# Patient Record
Sex: MALE | Race: WHITE
[De-identification: names, ages, dates, MRNs, and addresses within clinical notes are randomized per-mention and may not be internally consistent; named-entity substitution may affect disease eponyms.]

---

## 2017-04-17 ENCOUNTER — HOSPITAL ENCOUNTER (INPATIENT)
Dept: HOSPITAL 45 - C.EDB | Age: 77
LOS: 1 days | Discharge: HOME | DRG: 419 | End: 2017-04-18
Attending: INTERNAL MEDICINE | Admitting: HOSPITALIST
Payer: COMMERCIAL

## 2017-04-17 VITALS
BODY MASS INDEX: 26.61 KG/M2 | HEIGHT: 69 IN | HEIGHT: 69 IN | WEIGHT: 179.68 LBS | WEIGHT: 179.68 LBS | BODY MASS INDEX: 26.61 KG/M2

## 2017-04-17 VITALS — DIASTOLIC BLOOD PRESSURE: 83 MMHG | SYSTOLIC BLOOD PRESSURE: 146 MMHG | HEART RATE: 80 BPM

## 2017-04-17 VITALS
OXYGEN SATURATION: 97 % | DIASTOLIC BLOOD PRESSURE: 79 MMHG | HEART RATE: 93 BPM | TEMPERATURE: 97.52 F | SYSTOLIC BLOOD PRESSURE: 118 MMHG

## 2017-04-17 VITALS
OXYGEN SATURATION: 93 % | HEART RATE: 133 BPM | TEMPERATURE: 97.7 F | SYSTOLIC BLOOD PRESSURE: 122 MMHG | DIASTOLIC BLOOD PRESSURE: 79 MMHG

## 2017-04-17 VITALS
HEART RATE: 83 BPM | TEMPERATURE: 99.14 F | SYSTOLIC BLOOD PRESSURE: 117 MMHG | OXYGEN SATURATION: 94 % | DIASTOLIC BLOOD PRESSURE: 75 MMHG

## 2017-04-17 VITALS
SYSTOLIC BLOOD PRESSURE: 108 MMHG | HEART RATE: 79 BPM | DIASTOLIC BLOOD PRESSURE: 66 MMHG | OXYGEN SATURATION: 93 % | TEMPERATURE: 98.6 F

## 2017-04-17 DIAGNOSIS — I45.10: ICD-10-CM

## 2017-04-17 DIAGNOSIS — N40.0: ICD-10-CM

## 2017-04-17 DIAGNOSIS — Z87.891: ICD-10-CM

## 2017-04-17 DIAGNOSIS — Z79.82: ICD-10-CM

## 2017-04-17 DIAGNOSIS — I10: ICD-10-CM

## 2017-04-17 DIAGNOSIS — Z79.899: ICD-10-CM

## 2017-04-17 DIAGNOSIS — K80.00: Primary | ICD-10-CM

## 2017-04-17 DIAGNOSIS — M19.90: ICD-10-CM

## 2017-04-17 DIAGNOSIS — I48.0: ICD-10-CM

## 2017-04-17 DIAGNOSIS — K66.0: ICD-10-CM

## 2017-04-17 LAB
ALP SERPL-CCNC: 94 U/L (ref 45–117)
ALT SERPL-CCNC: 26 U/L (ref 12–78)
ANION GAP SERPL CALC-SCNC: 18 MMOL/L (ref 16–25)
ANION GAP SERPL CALC-SCNC: 5 MMOL/L (ref 3–11)
ANION GAP SERPL CALC-SCNC: 6 MMOL/L (ref 3–11)
APPEARANCE UR: CLEAR
AST SERPL-CCNC: 20 U/L (ref 15–37)
BASOPHILS # BLD: 0.01 K/UL (ref 0–0.2)
BASOPHILS NFR BLD: 0.1 %
BILIRUB UR-MCNC: (no result) MG/DL
BUN SERPL-MCNC: 15 MG/DL (ref 7–18)
BUN SERPL-MCNC: 24 MG/DL (ref 7–18)
BUN/CREAT SERPL: 12.1 (ref 10–20)
BUN/CREAT SERPL: 22 (ref 10–20)
CA-I BLD-SCNC: 1.16 MMOL/L (ref 1.12–1.32)
CALCIUM SERPL-MCNC: 8.1 MG/DL (ref 8.5–10.1)
CALCIUM SERPL-MCNC: 8.6 MG/DL (ref 8.5–10.1)
CHLORIDE BLD-SCNC: 98 MEQ/L (ref 101–112)
CHLORIDE SERPL-SCNC: 103 MMOL/L (ref 98–107)
CHLORIDE SERPL-SCNC: 106 MMOL/L (ref 98–107)
CO2 SERPL-SCNC: 30 MMOL/L (ref 21–32)
CO2 SERPL-SCNC: 32 MMOL/L (ref 21–32)
COLOR UR: YELLOW
COMPLETE: YES
CREAT BLD-MCNC: 0.9 MG/DL (ref 0.6–1.3)
CREAT CL PREDICTED SERPL C-G-VRATE: 51.6 ML/MIN
CREAT CL PREDICTED SERPL C-G-VRATE: 56.3 ML/MIN
CREAT SERPL-MCNC: 1.1 MG/DL (ref 0.6–1.4)
CREAT SERPL-MCNC: 1.2 MG/DL (ref 0.6–1.4)
EOSINOPHIL NFR BLD AUTO: 211 K/UL (ref 130–400)
GLUCOSE SERPL-MCNC: 132 MG/DL (ref 70–99)
GLUCOSE SERPL-MCNC: 160 MG/DL (ref 70–99)
HCT VFR BLD AUTO: 48 % (ref 42–52)
HCT VFR BLD CALC: 44.9 % (ref 42–52)
HGB BLD-MCNC: 16.3 G/DL (ref 14–18)
IG%: 0.1 %
IMM GRANULOCYTES NFR BLD AUTO: 13.7 %
ISTAT CARBON DIOXIDE: 26 MEQ/L (ref 24–31)
LYMPHOCYTES # BLD: 1.15 K/UL (ref 1.2–3.4)
MAGNESIUM SERPL-MCNC: 2.2 MG/DL (ref 1.8–2.4)
MANUAL MICROSCOPIC REQUIRED?: NO
MCH RBC QN AUTO: 32.1 PG (ref 25–34)
MCHC RBC AUTO-ENTMCNC: 35.2 G/DL (ref 32–36)
MCV RBC AUTO: 91.3 FL (ref 80–100)
MONOCYTES NFR BLD: 8 %
NEUTROPHILS # BLD AUTO: 0.8 %
NEUTROPHILS NFR BLD AUTO: 77.3 %
NITRITE UR QL STRIP: (no result)
PH UR STRIP: 5 [PH] (ref 4.5–7.5)
PMV BLD AUTO: 10.8 FL (ref 7.4–10.4)
POTASSIUM SERPL-SCNC: 3.9 MMOL/L (ref 3.5–5.1)
POTASSIUM SERPL-SCNC: 4.1 MMOL/L (ref 3.5–5.1)
RBC # BLD AUTO: 4.92 M/UL (ref 4.7–6.1)
REVIEW REQ?: NO
SODIUM BLD-SCNC: 138 MEQ/L (ref 135–144)
SODIUM SERPL-SCNC: 141 MMOL/L (ref 136–145)
SODIUM SERPL-SCNC: 141 MMOL/L (ref 136–145)
SP GR UR STRIP: 1.02 (ref 1–1.03)
URINE EPITHELIAL CELL AUTO: (no result) /LPF (ref 0–5)
UROBILINOGEN UR-MCNC: (no result) MG/DL
WBC # BLD AUTO: 8.39 K/UL (ref 4.8–10.8)
ZZUR CULT IF INDIC CLEAN CATCH: NO

## 2017-04-17 PROCEDURE — 0FT44ZZ RESECTION OF GALLBLADDER, PERCUTANEOUS ENDOSCOPIC APPROACH: ICD-10-PCS | Performed by: SURGERY

## 2017-04-17 PROCEDURE — BF100ZZ FLUOROSCOPY OF BILE DUCTS USING HIGH OSMOLAR CONTRAST: ICD-10-PCS | Performed by: SURGERY

## 2017-04-17 RX ADMIN — AMIODARONE HYDROCHLORIDE SCH MG: 200 TABLET ORAL at 21:15

## 2017-04-17 RX ADMIN — SODIUM CHLORIDE SCH MLS/HR: 900 INJECTION, SOLUTION INTRAVENOUS at 20:21

## 2017-04-17 RX ADMIN — CEFOXITIN SODIUM SCH MLS/HR: 1 POWDER, FOR SOLUTION INTRAVENOUS at 21:20

## 2017-04-17 RX ADMIN — CEFOXITIN SODIUM SCH MLS/HR: 1 POWDER, FOR SOLUTION INTRAVENOUS at 14:11

## 2017-04-17 NOTE — HISTORY & PHYSICAL BRIDGE NOTE
H&P Re-Evaluation


Bridge Note:


I have examined the patient, reviewed the History & Physical and in the 

interval since the performance of the History & Physical I have noted the 

following changes of clinical significance: No changes notedwife at bedside all 

questions answered for tri duarte possible open

## 2017-04-17 NOTE — EMERGENCY ROOM VISIT NOTE
History


Report prepared by Marciano:  Marisol Rodriguez


Under the Supervision of:  Dr. Luis Fernando Morris M.D.


First contact with patient:  03:35


Chief Complaint:  ABDOMINAL PAIN


Stated Complaint:  STOMACH PAIN,BLOATING,GAS





History of Present Illness


The patient is a 76 year old male who presents to the Emergency Room with 

complaints of persistent central abdominal pain that began last evening.  He 

currently rates his discomfort as an 8/10 in severity.  The patient states that 

he has a history of a hemicolectomy, noting that he had a lesion removed from 

his cecum.  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 

liver, stating that he had an MRI last Thursday.  The patient states that he 

developed abdominal pain last evening, but denies any nausea or vomiting.  He 

associates abdominal bloating with his symptoms.  The patient reports normal 

bowel movements over the last several days.  He notes that he has gallstones, 

but denies any history of any previous gallbladder attacks.  The patient denies 

any history of aortic problems or kidney problems.





   Source of History:  patient


   Onset:  last evening


   Position:  abdomen (central)


   Symptom Intensity:  8/10


   Timing:  other (persistent)


   Associated Symptoms:  No nausea, No vomiting


Note:


Associated Symptoms: abdominal bloating.





Review of Systems


See HPI for pertinent positives & negatives. A total of 10 systems reviewed and 

were otherwise negative.





Past Medical & Surgical


Medical Problems:


(1) Tubulovillous adenoma of colon








Family History


No pertinent family history stated.





Social History


Smoking Status:  Never Smoker


Marital Status:  


Housing Status:  lives with significant other


Occupation Status:  retired





Current/Historical Medications


Scheduled


Ascorbic Acid (Vitamin C), 1,000 MG PO DAILY


Aspirin (Aspirin 81), 81 MG PO DAILY


Cranberry (Vaccinium Macrocarp (Cranberry Fruit), 810 MG PO TID


Finasteride (Propecia), 1 MG PO DAILY


Fish Oil ([place]-3), 1 CAP PO DAILY


Hydrochlorothiazide (Hydrochlorothiazide), 12.5 MG PO QAM


Loratadine (Claritin), 10 MG PO DAILY


Metoprolol Succ (Toprol Xl) (Toprol-Xl), 25 MG PO DAILY


Multivitamin (Multivitamin), 1 TAB PO DAILY


Tamsulosin Hcl (Flomax), 0.4 MG PO DAILY





Miscellaneous Medications


Glucosamine-Chondroitin-Hyalur (Move Free Joint Health Ad), 1 TAB PO





Allergies


Coded Allergies:  


     No Known Allergies (Verified , 4/17/17)





Physical Exam


Vital Signs











  Date Time  Temp Pulse Resp B/P Pulse Ox O2 Delivery O2 Flow Rate FiO2


 


4/17/17 06:13  78 20 115/70 95 Room Air  


 


4/17/17 05:02  74 20 137/79 94 Room Air  


 


4/17/17 03:27 36.4 66 18 134/85 95 Room Air  











Physical Exam


GENERAL: Patient is uncomfortable appearing and in moderate distress.


HEENT: No acute trauma, normocephalic atraumatic, mucous membranes moist, no 

nasal congestion, no scleral icterus.


NECK: No stridor, no adenopathy, no meningismus, trachea is midline.


LUNGS: No dyspnea. Clear to auscultation and equal bilaterally. No wheeze, no 

rhonchi.


HEART: Regular rate and rhythm.  No murmurs, rubs, gallops appreciated.


ABDOMEN: Ventral hernia with tenderness to palpation over mid abdomen.  Soft, 

bowel sounds positive, no peritonitis.


BACK: No midline tenderness, no CVA tenderness


EXTREMITIES: Normal motion all extremities, no cyanosis, no edema.


NEUROLOGIC: Alert and oriented, no acute motor or sensory deficits, no focal 

weakness, cranial nerves grossly intact.


SKIN: No rash, no jaundice, no diaphoresis.





Medical Decision & Procedures


ER Provider


Diagnostic Interpretation:


Radiology results and stated below per my review and radiologist interpretation:





CT Abdomen and Pelvis: 





Distended gallbladder with gallstones.  Ultrasound may be considered if there 

is concern for cholecystitis.


Fluid in small bowel loops and areas of questionable mild wall thickening and 

nonspecific mesenteric stranding.  Correlate clinically for enteritis.  No 

significant bowel distension to suggest obstruction.  Moderate amount of stool 

in the colon.  Scattered colonic diverticula.  Prior bowel surgery noted.  Mild 

bladder wall thickening or underdistention.  Consider urinalysis if there is 

concern for cystitis.  Nonspecific perinephric stranding.  Renal parapelvic 

cysts.  No hydronephrosis.


Small bilateral fat-containing inguinal hernias with fluid in the right 

inguinal hernia.





Radiologist: Verónica Guerrero MD   Phone: [phone]





Study ready at 0420 and initial results transmitted at 3424.





Laboratory Results


4/17/17 03:50








Red Blood Count 4.92, Mean Corpuscular Volume 91.3, Mean Corpuscular Hemoglobin 

32.1, Mean Corpuscular Hemoglobin Concent 35.2, Mean Platelet Volume 10.8, 

Neutrophils (%) (Auto) 77.3, Lymphocytes (%) (Auto) 13.7, Monocytes (%) (Auto) 

8.0, Eosinophils (%) (Auto) 0.8, Basophils (%) (Auto) 0.1, Neutrophils # (Auto) 

6.48, Lymphocytes # (Auto) 1.15, Monocytes # (Auto) 0.67, Eosinophils # (Auto) 

0.07, Basophils # (Auto) 0.01





4/17/17 03:50

















Test


  4/17/17


03:50 4/17/17


03:57 4/17/17


04:00


 


White Blood Count


  8.39 K/uL


(4.8-10.8) 


  


 


 


Red Blood Count


  4.92 M/uL


(4.7-6.1) 


  


 


 


Hemoglobin


  15.8 g/dL


(14.0-18.0) 


  


 


 


Hematocrit 44.9 % (42-52)   


 


Mean Corpuscular Volume


  91.3 fL


() 


  


 


 


Mean Corpuscular Hemoglobin


  32.1 pg


(25-34) 


  


 


 


Mean Corpuscular Hemoglobin


Concent 35.2 g/dl


(32-36) 


  


 


 


Platelet Count


  211 K/uL


(130-400) 


  


 


 


Mean Platelet Volume


  10.8 fL


(7.4-10.4) 


  


 


 


Neutrophils (%) (Auto) 77.3 %   


 


Lymphocytes (%) (Auto) 13.7 %   


 


Monocytes (%) (Auto) 8.0 %   


 


Eosinophils (%) (Auto) 0.8 %   


 


Basophils (%) (Auto) 0.1 %   


 


Neutrophils # (Auto)


  6.48 K/uL


(1.4-6.5) 


  


 


 


Lymphocytes # (Auto)


  1.15 K/uL


(1.2-3.4) 


  


 


 


Monocytes # (Auto)


  0.67 K/uL


(0.11-0.59) 


  


 


 


Eosinophils # (Auto)


  0.07 K/uL


(0-0.5) 


  


 


 


Basophils # (Auto)


  0.01 K/uL


(0-0.2) 


  


 


 


RDW Standard Deviation


  47.1 fL


(36.4-46.3) 


  


 


 


RDW Coefficient of Variation


  13.9 %


(11.5-14.5) 


  


 


 


Immature Granulocyte % (Auto) 0.1 %   


 


Immature Granulocyte # (Auto)


  0.01 K/uL


(0.00-0.02) 


  


 


 


Est Creatinine Clear Calc


Drug Dose 56.3 ml/min 


  


  


 


 


Estimated GFR (


American) 74.7 


  


  


 


 


Estimated GFR (Non-


American 64.4 


  


  


 


 


BUN/Creatinine Ratio 22.0 (10-20)   


 


Calcium Level


  8.6 mg/dl


(8.5-10.1) 


  


 


 


Total Bilirubin


  0.4 mg/dl


(0.2-1) 


  


 


 


Direct Bilirubin


  0.1 mg/dl


(0-0.2) 


  


 


 


Aspartate Amino Transf


(AST/SGOT) 20 U/L (15-37) 


  


  


 


 


Alanine Aminotransferase


(ALT/SGPT) 26 U/L (12-78) 


  


  


 


 


Alkaline Phosphatase


  94 U/L


() 


  


 


 


Troponin I


  < 0.015 ng/ml


(0-0.045) 


  


 


 


Total Protein


  7.4 gm/dl


(6.4-8.2) 


  


 


 


Albumin


  3.7 gm/dl


(3.4-5.0) 


  


 


 


Lipase


  205 U/L


() 


  


 


 


Bedside Hemoglobin


  


  16.3 g/dl


(14.0-18.0) 


 


 


Bedside Hematocrit  48 % (42-52)  


 


Bedside Sodium


  


  138 mEq/L


(135-144) 


 


 


Bedside Potassium


  


  3.9 mEq/L


(3.3-5.0) 


 


 


Bedside Chloride


  


  98 mEq/L


(101-112) 


 


 


Bedside Total CO2


  


  26 mEq/l


(24-31) 


 


 


Anion Gap


  


  18.0 mmol/L


(16-25) 


 


 


Bedside Blood Urea Nitrogen


  


  26 mg/dl


(7-18) 


 


 


Bedside Creatinine


  


  0.9 mg/dl


(0.6-1.3) 


 


 


Bedside Glucose (other)


  


  132 mg/dl


(70-99) 


 


 


Bedside Ionized Calcium (Jayme)


  


  1.16 mmol/l


(1.12-1.32) 


 


 


Urine Color   YELLOW 


 


Urine Appearance   CLEAR (CLEAR) 


 


Urine pH   5.0 (4.5-7.5) 


 


Urine Specific Gravity


  


  


  1.025


(1.000-1.030)


 


Urine Protein   NEG (NEG) 


 


Urine Glucose (UA)   NEG (NEG) 


 


Urine Ketones   NEG (NEG) 


 


Urine Occult Blood   NEG (NEG) 


 


Urine Nitrite   NEG (NEG) 


 


Urine Bilirubin   NEG (NEG) 


 


Urine Urobilinogen   NEG (NEG) 


 


Urine Leukocyte Esterase   NEG (NEG) 


 


Urine WBC (Auto)   1-5 /hpf (0-5) 


 


Urine RBC (Auto)   0-4 /hpf (0-4) 


 


Urine Hyaline Casts (Auto)   0 /lpf (0-5) 


 


Urine Epithelial Cells (Auto)


  


  


  5-10 /lpf


(0-5)


 


Urine Bacteria (Auto)   NEG (NEG) 








Laboratory results as reviewed by me.





Medications Administered











 Medications


  (Trade)  Dose


 Ordered  Sig/Santiago


 Route  Start Time


 Stop Time Status Last Admin


Dose Admin


 


 Sodium Chloride


  (Nss 1000ml)  1,000 ml @ 


 999 mls/hr  Q1H1M STAT


 IV  4/17/17 03:41


 4/17/17 04:41 DC 4/17/17 03:41


999 MLS/HR


 


 Fentanyl Citrate


  (Fentanyl Inj)  75 mcg  NOW  STAT


 IV  4/17/17 03:41


 4/17/17 03:43 DC 4/17/17 04:05


75 MCG


 


 Ondansetron HCl


  (Zofran Inj)  4 mg  NOW  STAT


 IV  4/17/17 03:41


 4/17/17 03:43 DC 4/17/17 04:06


4 MG


 


 Cefoxitin Sodium


  (Mefoxin IV)  2,000 mg  NOW  STAT


 IV  4/17/17 05:59


 4/17/17 06:00 DC 4/17/17 06:12


2,000 MG











ED Course


0336: The patient was evaluated in room B2. A complete history and physical 

exam was performed.





0341: Ordered Zofran Inj 4 mg IV, Fentanyl Inj 75 mcg IV, Sodium Chloride 1000 

ml @ 999 mls/hr IV.





0454: I reevaluated the patient and he is feeling much better and only notes 

some mild aching in his mid-abdomen.





Medical Decision


Differential:  Appendicitis, Diverticulitis, PUD/Gastritis, Biliary Pathology, 

UTI, Pyelonephritis, Renal Colic, Bowel Obstruction, Aortic Pathology, Acute 

Coronary Syndrome, amongst other pathologies entertained.





Pleasant 76 yr old male with history of right hemicolectomy 6 months ago 

arrives with complaint of epigastric/umbilical discomfort.  Improved with 

single dose fentanyl and doing well.  CT with gastroenteritis but also large 

GB.  Labs look good.  US shows enlarged GB with mildly thickened wall and large 

stone burden.  Patient still feeling well. With findings I discussed case with 

Gen Surg and advise hospitalist bring in for HIDA scan and further monitoring.  

I will give dose Mefoxin in case this represents early acute cholecystitis.  

Patient and wife comfortable with this plan.  Discussed case with [facility] 

Hospitalist.





Impression





 Primary Impression:  


 Dilated gallbladder


 Additional Impressions:  


 Gallstones


 Thickening of wall of gallbladder


 Epigastric abdominal pain





Scribe Attestation


The scribe's documentation has been prepared under my direction and personally 

reviewed by me in its entirety. I confirm that the note above accurately 

reflects all work, treatment, procedures, and medical decision making performed 

by me.





Departure Information


Referrals


Angel Padron MD (PCP)





Patient Instructions


[facility]





Problem Qualifiers

## 2017-04-17 NOTE — PROGRESS NOTE
Medicine Progress Note


Date & Time of Visit:


Apr 17, 2017 at 17:47.


Subjective


patient seen sitting up in bed, in good spirits


telemetry monitor reveals sinus rhythm, HR 80s


denies chest pain, dyspnea, palpitations, dizziness


no abdominal pain ,nausea


no other symptoms





Objective





Last 8 Hrs








  Date Time  Temp Pulse Resp B/P Pulse Ox O2 Delivery O2 Flow Rate FiO2


 


4/17/17 16:00      Room Air  


 


4/17/17 15:06 36.5 133 18 122/79 93 Room Air  


 


4/17/17 14:00 36.4 93 18 118/79 97 Nasal Cannula 2.0 


 


4/17/17 14:00     97 Nasal Cannula 2.0 


 


4/17/17 13:45  90 24 114/79 97 Nasal Cannula 2 


 


4/17/17 13:30  78 20 108/65 95 Nasal Cannula 2 


 


4/17/17 13:20  74 16 104/76 98 Nasal Cannula 2 


 


4/17/17 13:10 36.8 74 15 110/80 95 Nasal Cannula 2 


 


4/17/17 13:00  87 12 113/69 97 Nasal Cannula 2 


 


4/17/17 12:50  82 12 127/76 100 Mask 10 


 


4/17/17 12:40  99 16 133/91 99 Mask 10 


 


4/17/17 12:34 36.1 101 12 115/84 99 Mask 10 


 


4/17/17 09:58  80 16 146/83  Room Air  








Physical Exam:


General- oriented x 3, not in distress,speaks in sentences with no effort





Head-  atraumatic





Eyes- EOMI, anicteric





ENT- oropharynx clear





Neck- supple, no JVD





Lungs- clear breath sounds bilaterally, no rales/wheezes





Heart-normal rate, regular rhythm; no murmurs





Abdomen- normal bowel sounds, non distended, lap dina sites no bleeding/

discahrge, soft, nontender, no masses





Extremities- no pretibial edema, no calf tenderness





Neuro- alert, oriented x 3;no gross focal deficits





Skin- warm & dry


Laboratory Results:





Last 24 Hours








Test


  4/17/17


03:50 4/17/17


03:57 4/17/17


04:00 4/17/17


16:10


 


White Blood Count 8.39 K/uL    


 


Red Blood Count 4.92 M/uL    


 


Hemoglobin 15.8 g/dL    


 


Hematocrit 44.9 %    


 


Mean Corpuscular Volume 91.3 fL    


 


Mean Corpuscular Hemoglobin 32.1 pg    


 


Mean Corpuscular Hemoglobin


Concent 35.2 g/dl 


  


  


  


 


 


Platelet Count 211 K/uL    


 


Mean Platelet Volume 10.8 fL    


 


Neutrophils (%) (Auto) 77.3 %    


 


Lymphocytes (%) (Auto) 13.7 %    


 


Monocytes (%) (Auto) 8.0 %    


 


Eosinophils (%) (Auto) 0.8 %    


 


Basophils (%) (Auto) 0.1 %    


 


Neutrophils # (Auto) 6.48 K/uL    


 


Lymphocytes # (Auto) 1.15 K/uL    


 


Monocytes # (Auto) 0.67 K/uL    


 


Eosinophils # (Auto) 0.07 K/uL    


 


Basophils # (Auto) 0.01 K/uL    


 


RDW Standard Deviation 47.1 fL    


 


RDW Coefficient of Variation 13.9 %    


 


Immature Granulocyte % (Auto) 0.1 %    


 


Immature Granulocyte # (Auto) 0.01 K/uL    


 


Sodium Level 141 mmol/L    141 mmol/L 


 


Potassium Level 4.1 mmol/L    3.9 mmol/L 


 


Chloride Level 103 mmol/L    106 mmol/L 


 


Carbon Dioxide Level 32 mmol/L    30 mmol/L 


 


Anion Gap 6.0 mmol/L  18.0 mmol/L   5.0 mmol/L 


 


Blood Urea Nitrogen 24 mg/dl    15 mg/dl 


 


Creatinine 1.10 mg/dl    1.20 mg/dl 


 


Est Creatinine Clear Calc


Drug Dose 56.3 ml/min 


  


  


  51.6 ml/min 


 


 


Estimated GFR (


American) 74.7 


  


  


  67.2 


 


 


Estimated GFR (Non-


American 64.4 


  


  


  58.0 


 


 


BUN/Creatinine Ratio 22.0    12.1 


 


Random Glucose 132 mg/dl    160 mg/dl 


 


Calcium Level 8.6 mg/dl    8.1 mg/dl 


 


Total Bilirubin 0.4 mg/dl    


 


Direct Bilirubin 0.1 mg/dl    


 


Aspartate Amino Transf


(AST/SGOT) 20 U/L 


  


  


  


 


 


Alanine Aminotransferase


(ALT/SGPT) 26 U/L 


  


  


  


 


 


Alkaline Phosphatase 94 U/L    


 


Troponin I < 0.015 ng/ml    


 


Total Protein 7.4 gm/dl    


 


Albumin 3.7 gm/dl    


 


Lipase 205 U/L    


 


Bedside Hemoglobin  16.3 g/dl   


 


Bedside Hematocrit  48 %   


 


Bedside Sodium  138 mEq/L   


 


Bedside Potassium  3.9 mEq/L   


 


Bedside Chloride  98 mEq/L   


 


Bedside Total CO2  26 mEq/l   


 


Bedside Blood Urea Nitrogen  26 mg/dl   


 


Bedside Creatinine  0.9 mg/dl   


 


Bedside Glucose (other)  132 mg/dl   


 


Bedside Ionized Calcium (Jayme)  1.16 mmol/l   


 


Urine Color   YELLOW  


 


Urine Appearance   CLEAR  


 


Urine pH   5.0  


 


Urine Specific Gravity   1.025  


 


Urine Protein   NEG  


 


Urine Glucose (UA)   NEG  


 


Urine Ketones   NEG  


 


Urine Occult Blood   NEG  


 


Urine Nitrite   NEG  


 


Urine Bilirubin   NEG  


 


Urine Urobilinogen   NEG  


 


Urine Leukocyte Esterase   NEG  


 


Urine WBC (Auto)   1-5 /hpf  


 


Urine RBC (Auto)   0-4 /hpf  


 


Urine Hyaline Casts (Auto)   0 /lpf  


 


Urine Epithelial Cells (Auto)   5-10 /lpf  


 


Urine Bacteria (Auto)   NEG  


 


Magnesium Level    2.2 mg/dl 








 








 Date/Time


Source Procedure


Growth Status


 


 


 4/17/17 00:00


Gallbladder Gram Stain - Final Resulted


 


 4/17/17 00:00


Gallbladder Bacterial Culture


Pending Resulted











Assessment & Plan


76 year old male with history of hypertension and BPH presenting with abdominal 

pain





ACUTE CHOLECYSTITIS


(+) HIDA scan


s/p Lap Dina 4/17/17


post op in PACU, developed new onset a fib (management noted below)


otherwise, asymptomatic during exam in tele


on Cefoxitin IV





NEW ONSET ATRIAL FIBRILLATION


noted post op while in PACU


Cardiology consulted, Amiodarone IV started


patient converted to Sinus rhythm, transitioned to Amiodarone PO





HTN 


stable 


Continue metoprolol


hold HCTZ


monitor BP





BPH


continue Tamsulosin





Tubulovillous Adenoma s/p Right Hemicolectomy Nov 2016


ff up with Dr. Abbasi








DVT px SCD 





CODE STATUS FULL CODE





DISPOSITION


anticipate d/c home when medically stable, ff up with Dr. Padron for 

Primary Care


Current Inpatient Medications:





 Current Inpatient Medications








 Medications


  (Trade)  Dose


 Ordered  Sig/Santiago


 Route  Start Time


 Stop Time Status Last Admin


Dose Admin


 


 Ioversol


  (Optiray 320)  100 ml  UD  PRN


 IV  4/17/17 04:00


 4/21/17 03:59   


 


 


 Ondansetron HCl


  (Zofran Inj)  4 mg  Q6H  PRN


 IV  4/17/17 06:45


 5/17/17 06:44   


 


 


 Metoprolol


 Succinate 25 mg  25 mg  HS


 PO  4/17/17 21:00


 5/17/17 20:59   


 


 


 Sodium Chloride  1,000 ml @ 


 75 mls/hr  [id number]


 IV  4/17/17 08:00


 4/17/17 21:19  4/17/17 10:42


75 MLS/HR


 


 Cefoxitin Sodium/


 Dextrose


  (Mefoxin IV/D5


 50ml)  60 ml @ 


 120 mls/hr  Q8H


 IV  4/17/17 14:00


 4/19/17 13:59  4/17/17 14:11


120 MLS/HR


 


 Oxycodone/


 Acetaminophen


  (Percocet


 5-325mg Tab)  1 tab  Q4H  PRN


 PO  4/17/17 12:30


 5/1/17 12:29   


 


 


 Morphine Sulfate


  (MoRPHine


 SULFATE INJ)  4 mg  Q1H  PRN


 IV  4/17/17 12:30


 5/1/17 12:29   


 


 


 Tamsulosin HCl


  (Flomax Cap)  0.4 mg  DAILY


 PO  4/18/17 09:00


 5/18/17 08:59   


 


 


 Miscellaneous


 Information


  (Order Awaiting


 Action)  1 ea  QS


 N/A  4/18/17 00:00


 5/18/17 00:00   


 


 


 Amiodarone HCl


  (Cordarone Tab)  400 mg  TID


 PO  4/17/17 21:00


 5/17/17 20:59

## 2017-04-17 NOTE — MEDICAL CONSULT
Consultation


Date of Consultation:


Apr 17, 2017.


Attending Physician:





Reason for Consultation:


abd pain


History of Present Illness


75 yo male with acute oo abd pain- epigastric/ central assoc w/ bloating.


        no N/V, known gallstones, no prior episodes related to gb





wbc, LFTs, lipase all normal


alert in no distress





recent 11/2016 colectomy by Dr Abbasi- TV adenoma





Past Medical/Surgical History


Medical Problems:


(1) Dilated gallbladder


Status: Acute  





(2) Epigastric abdominal pain


Status: Acute  





(3) Gallstones


Status: Acute  





(4) Thickening of wall of gallbladder


Status: Acute  











Social History


Smoking Status:  Never Smoker


Marital Status:  


Housing Status:  lives with significant other


Occupation Status:  retired





Allergies


Coded Allergies:  


     No Known Allergies (Verified , 4/17/17)





Current Inpatient Medications





 Current Inpatient Medications








 Medications


  (Trade)  Dose


 Ordered  Sig/Santiago


 Route  Start Time


 Stop Time Status Last Admin


Dose Admin


 


 Ioversol


  (Optiray 320)  100 ml  UD  PRN


 IV  4/17/17 04:00


 4/21/17 03:59   


 


 


 Ondansetron HCl


  (Zofran Inj)  4 mg  Q6H  PRN


 IV  4/17/17 06:45


 5/17/17 06:44 UNV  


 











Review of Systems


Constitutional:  No chills, No fever


Respiratory:  No cough, No shortness of breath, No sputum


Cardiovascular:  No chest pain


Abdomen:  + pain, No nausea, No vomiting


Musculoskeletal:  No joint pain


Genitourinary - Male:  No dysuria


Neurologic:  No weakness


Integumentary:  No rash





Physical Exam











  Date Time  Temp Pulse Resp B/P Pulse Ox O2 Delivery O2 Flow Rate FiO2


 


4/17/17 06:13  78 20 115/70 95 Room Air  


 


4/17/17 05:02  74 20 137/79 94 Room Air  


 


4/17/17 03:27 36.4 66 18 134/85 95 Room Air  








General Appearance:  WD/WN, no apparent distress


Head:  atraumatic


Eyes:  sclerae normal


Neck:  supple


Respiratory/Chest:  lungs clear, no respiratory distress


Cardiovascular:  regular rate, rhythm


Abdomen/GI:  normal bowel sounds, non tender, soft


Extremities/Musculoskelatal:  no pedal edema


Skin:  no rash





Laboratory Results





Last 24 Hours








Test


  4/17/17


03:50 4/17/17


03:57 4/17/17


04:00


 


White Blood Count 8.39 K/uL   


 


Red Blood Count 4.92 M/uL   


 


Hemoglobin 15.8 g/dL   


 


Hematocrit 44.9 %   


 


Mean Corpuscular Volume 91.3 fL   


 


Mean Corpuscular Hemoglobin 32.1 pg   


 


Mean Corpuscular Hemoglobin


Concent 35.2 g/dl 


  


  


 


 


Platelet Count 211 K/uL   


 


Mean Platelet Volume 10.8 fL   


 


Neutrophils (%) (Auto) 77.3 %   


 


Lymphocytes (%) (Auto) 13.7 %   


 


Monocytes (%) (Auto) 8.0 %   


 


Eosinophils (%) (Auto) 0.8 %   


 


Basophils (%) (Auto) 0.1 %   


 


Neutrophils # (Auto) 6.48 K/uL   


 


Lymphocytes # (Auto) 1.15 K/uL   


 


Monocytes # (Auto) 0.67 K/uL   


 


Eosinophils # (Auto) 0.07 K/uL   


 


Basophils # (Auto) 0.01 K/uL   


 


RDW Standard Deviation 47.1 fL   


 


RDW Coefficient of Variation 13.9 %   


 


Immature Granulocyte % (Auto) 0.1 %   


 


Immature Granulocyte # (Auto) 0.01 K/uL   


 


Sodium Level 141 mmol/L   


 


Potassium Level 4.1 mmol/L   


 


Chloride Level 103 mmol/L   


 


Carbon Dioxide Level 32 mmol/L   


 


Anion Gap 6.0 mmol/L  18.0 mmol/L  


 


Blood Urea Nitrogen 24 mg/dl   


 


Creatinine 1.10 mg/dl   


 


Est Creatinine Clear Calc


Drug Dose 56.3 ml/min 


  


  


 


 


Estimated GFR (


American) 74.7 


  


  


 


 


Estimated GFR (Non-


American 64.4 


  


  


 


 


BUN/Creatinine Ratio 22.0   


 


Random Glucose 132 mg/dl   


 


Calcium Level 8.6 mg/dl   


 


Total Bilirubin 0.4 mg/dl   


 


Direct Bilirubin 0.1 mg/dl   


 


Aspartate Amino Transf


(AST/SGOT) 20 U/L 


  


  


 


 


Alanine Aminotransferase


(ALT/SGPT) 26 U/L 


  


  


 


 


Alkaline Phosphatase 94 U/L   


 


Troponin I < 0.015 ng/ml   


 


Total Protein 7.4 gm/dl   


 


Albumin 3.7 gm/dl   


 


Lipase 205 U/L   


 


Bedside Hemoglobin  16.3 g/dl  


 


Bedside Hematocrit  48 %  


 


Bedside Sodium  138 mEq/L  


 


Bedside Potassium  3.9 mEq/L  


 


Bedside Chloride  98 mEq/L  


 


Bedside Total CO2  26 mEq/l  


 


Bedside Blood Urea Nitrogen  26 mg/dl  


 


Bedside Creatinine  0.9 mg/dl  


 


Bedside Glucose (other)  132 mg/dl  


 


Bedside Ionized Calcium (Jayme)  1.16 mmol/l  


 


Urine Color   YELLOW 


 


Urine Appearance   CLEAR 


 


Urine pH   5.0 


 


Urine Specific Gravity   1.025 


 


Urine Protein   NEG 


 


Urine Glucose (UA)   NEG 


 


Urine Ketones   NEG 


 


Urine Occult Blood   NEG 


 


Urine Nitrite   NEG 


 


Urine Bilirubin   NEG 


 


Urine Urobilinogen   NEG 


 


Urine Leukocyte Esterase   NEG 


 


Urine WBC (Auto)   1-5 /hpf 


 


Urine RBC (Auto)   0-4 /hpf 


 


Urine Hyaline Casts (Auto)   0 /lpf 


 


Urine Epithelial Cells (Auto)   5-10 /lpf 


 


Urine Bacteria (Auto)   NEG 











Assessment & Plan


4/17/17- abd pain, bloating- has gb distention and mild thickening. Has 

probable Lg gs near neck


             of gb. Pt does not feel bad at present and does not want surgery 

unless absolutely


             necessary- will check Hida scan and proceed as appropriate. I 

suspect he should have 


             cholecystectomy at some point to avoid worsening problems

## 2017-04-17 NOTE — DIAGNOSTIC IMAGING REPORT
ABDOMEN AND PELVIS CT WITH IV CONTRAST



CT DOSE: 415.57 mGy.cm



HISTORY: Pain. Nausea.  periumbilical pain and abd bloating. Rt Hemicolon 6

month ago



TECHNIQUE: Multiaxial CT images of the abdomen and pelvis were performed

following the use of intravenous contrast.



COMPARISON STUDY: None.



FINDINGS: Lung bases are clear. Liver is uniform. Gallbladder contains

examination of sludge and/or internal septations. May be a slight degree of

pericholecystic edema. Right upper quadrant ultrasonography is suggested.



Adrenal glands unremarkable. There are several renal peripelvic cysts. There is

a component of renal sinus lipomatosis.



Prior partial colectomy. Bowel pattern is considered nonobstructive. Bladder is

midline. No free fluid within the pelvic cul-de-sac.



IMPRESSION: 



1. Nonobstructive bowel pattern post partial colectomy.





2. The gallbladder demonstrates internal septations versus gallstones, as well

as what is suggestive of a trace amount of pericholecystic edematous change.





3. Right upper quadrant ultrasound suggested as follow-up.





4. Nonobstructive bowel pattern. 







Electronically signed by:  Martin Chambers M.D.

4/17/2017 7:24 AM



Dictated Date/Time:  4/17/2017 7:20 AM

## 2017-04-17 NOTE — OPERATIVE REPORT
DATE OF OPERATION:  04/17/2017

 

PREOPERATIVE DIAGNOSIS:  Acute and chronic cholecystitis, cholelithiasis.

 

POSTOPERATIVE DIAGNOSIS:  Same.

 

OPERATIVE PROCEDURE:  Laparoscopic cholecystectomy, intraoperative

cholangiogram.

 

SURGEON:  Dr. Kenney.

 

FIRST ASSISTANT:  Gibson Santana PA-C.

 

OPERATION AND FINDINGS: 

 

SUMMARY:  After general anesthesia,  the patient's abdomen was prepped with

Betadine solution and properly draped.  I made a small transverse incision

above the umbilicus just inferior to the midline incision that the patient

had had from previous laparoscopic hand-assisted right colon resection.  At

this point, under direct visualization, we entered the peritoneal cavity

where we entering the fascia we elevated with 0 Vicryl suture used as stay

sutures.  We placed the  5 mm trocar under direct visualization without the

sharp edge, controlled the pneumoperitoneum with the stay sutures and  CO2

insufflated.  At this point, we were able to see the gallbladder right upper

quadrant without any difficulty.  There were some adhesions to the midline

incision that the patient had from laparoscopic right colon resection.  The

incision that had been placed  at that time was approximately 4 inches long. 

At this point, under direct visualization, we placed a 5 mm epigastric, two 5

mm subcostal ports.  Gallbladder was thick walled.  We were able to then

aspirate it, controlled the entry site of the aspirating needle with a

grasper that we had placed over two 5 mm subcostal ports with preemptive

local analgesia 1% Xylocaine.  The alxeei hepatis was dissected out.  A

significant amount of edema in the area was identified, it seems like the

neck of the gallbladder contents which  radiographically showed it had a

large stone.  We identified the cystic duct without any difficulty, dissected

out easily with the edema.  We clamped it proximally.  A #4 urethral catheter

transversing abdominal wall and a 14 Angiocath was positioned in the cystic

duct.  Serial x-rays were taken, free flow into the duodenum.  No

obstruction.  The cholangiocath was removed.  The cystic duct doubly clipped

and divided.  The cystic artery was identified.  There was an anterior and

posterior branch coming off the main trunk at the edge of the gallbladder. 

We doubly clipped it and divided it.  The gallbladder was removed in

antegrade fashion  leaving much of the posterior peritoneum intact.  Actually

with the edema made the dissection fairly easy, although we did have some

bleeding in the peritoneum on the gallbladder.  We then took it off the

liver, we checked the liver bed for any bleeding, which was very little.  We

controlled it with coagulation then placed the gallbladder in an Endopouch

and took it out intact through the epigastric port.  We needed to enlarge the

epigastric port sufficient enough to take out about a 2 cm plus stone that

was embedded in  the neck of the gallbladder.  Once this had been performed,

we sent it for cultures.  The subhepatic and suprahepatic area was then

checked for hemostasis and appeared quite satisfactory.  I placed the camera

in subcostal port to visualize the umbilical entry, which  at this time as

mentioned before, we had identified some adhesions in that area.  The

epigastric port site was free of any bleeding.  At this point, we then

removed the individual trocars under direct visualization.  The epigastric

port site was closed with 0 Vicryl suture figure-of-eight x2.  In the

umbilical opening we used 0 Vicryl figure-of-eight x1,  we elevated the

fascia with the retention sutures, 4-0 Monocryl.  Steri-Strips applied.  The

procedure was tolerated well by the patient.  Estimated blood loss

approximately 5 mL.  The patient was taken to recovery room in good

condition.

 

 

I attest to the content of the Intraoperative Record and any orders documented therein. Any exceptio
ns are noted below.

## 2017-04-17 NOTE — CARDIOLOGY CONSULTATION
DATE OF CONSULTATION:  04/17/2017

 

CONSULTATION REQUESTED BY:  Ten Ventura MD

 

REASON FOR CONSULTATION:  New onset atrial fibrillation.

 

HISTORY OF PRESENT ILLNESS:  Mr. Agrawal is a very pleasant 76-year-old

gentleman who presented to [facility]

early in the a.m. of 04/17/2017 with a complaint of abdominal pain.  The

patient was found to have acute cholecystitis and he underwent laparoscopic

cholecystectomy, which was successful with Dr. Kenney.  The patient was

documented by anesthesia to be in normal sinus rhythm prior to the procedure;

however, during the surgery, flipped into atrial fibrillation with rapid

ventricular response.  He was hemodynamically stable and the procedure was

completed.  Upon waking up from anesthesia, he remains completely symptom

free and denied any chest pain, shortness of breath, palpitations,

lightheadedness, dizziness, or syncope.  At that time, I was contacted from

the recovery room and the patient was moved from general medical floor to the

telemetry unit.  He states he has never had this issue before and again he

feels well right now.

 

PAST SURGICAL HISTORY:

1.  Postoperative day 0 from a laparoscopic cholecystectomy.

2.  Multiple colonoscopies.

3.  Hemicolectomy.

4.  Tonsillectomy.

5.  Malignant melanoma resection.

 

PAST MEDICAL HISTORY:

1.  Melanoma.

2.  History of incomplete right bundle branch block.

3.  Hypertension.

4.  BPH.

 

FAMILY HISTORY:  Noncontributory.

 

SOCIAL HISTORY:  The patient has a very remote tobacco use history, quit in

1980.  Denies any alcohol or recreational drug use.  He is  and lives

at home with his wife.  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

virology.

 

REVIEW OF SYSTEMS:  As per HPI, all other review of systems reviewed and

negative at this time.

 

ALLERGIES:  No known drug allergies.

 

MEDICATIONS AS AN OUTPATIENT:

1.  Aspirin 81 mg daily.

2.  Flomax daily.

3.  Hydrochlorothiazide 12.5 mg daily.

4.  Toprol-XL 25 mg daily.

5.  Nasonex daily.

 

PHYSICAL EXAMINATION:

VITAL SIGNS:  Temperature 36.8, pulse 132, respiratory rate 12, and blood

pressure 114/79.

GENERAL:  Awake, alert, and oriented x3, in no acute distress.

HEENT:  Normocephalic and atraumatic.  Pupils are equal, round, and reactive

to light and accommodation.  Extraocular muscles intact.  Anicteric sclerae. 

Moist mucous membranes.

NECK:  No JVD, no bruit.

CARDIOVASCULAR:  Irregularly irregular and fast.  Unable to appreciate

murmurs, rubs or gallops.

PULMONARY:  Clear to auscultation bilaterally.  No rales, rhonchi, or

wheezing.

ABDOMEN:  Soft, but tender.  No rebound or guarding.  No organomegaly.

EXTREMITIES:  No clubbing, cyanosis or edema.  +2 pedal pulses bilaterally.

SKIN:  Warm and dry.

 

TEST RESULTS:  12-lead EKG performed in PACU independently reviewed at this

time shows atrial fibrillation at 87 beats per minute, underlying right

bundle branch block, otherwise unremarkable.

 

IMPRESSION:

1.  New onset atrial fibrillation with rapid ventricular response.

2.  Postop day 0 status post laparoscopic cholecystectomy.

3.  History of hypertension.

 

RECOMMENDATIONS:  It was my pleasure to see Mr. Agrawal in consultation today. 

The pathophysiology and treatment options for atrial fibrillation were

discussed with the patient and his wife at great lengths.  The patient was

counseled that given the fact that he was documented to be in normal sinus

rhythm with anesthesia today that I believe our most prudent course of action

at this point would be a chemical cardioversion and the medication will be

used and it will be amiodarone.  The benefits and potential side effects were

discussed.  The patient was counseled and my hope is that should we get him

back to a normal rhythm, he will not need long-term anticoagulation and we

will likely leave him on amiodarone for maybe a few weeks and then hopefully,

will be able to be discontinued.  The patient states he understands and

agrees with the above plan.  Amiodarone will be started at this time. 

Otherwise, he will be continued on his outpatient metoprolol.

## 2017-04-17 NOTE — DIAGNOSTIC IMAGING REPORT
NUCLEAR MEDICINE HEPATOBILIARY SCAN 



HISTORY: Epigastric pain.  gallbladder wall thickening/stones



COMPARISON:  Abdominal ultrasound 4/17/2017.



TECHNIQUE: Immediately following the intravenous administration of 5.3 mCi

Tc-99m Choletec, dynamic anterior abdominal imaging was performed.

 

FINDINGS:

Uniform hepatic tracer accumulation is shown. Prompt intrahepatic biliary

excretion is seen. The common bile duct, and small bowel are all visualized by

15 minutes. The gallbladder is not identified on the initial 60 minutes. 2 mg of

intravenous morphine was administered. An additional 30 minutes was performed

and did not demonstrate the gallbladder.



IMPRESSION:  

The gallbladder is not identified. Therefore, this is consistent with cystic

duct obstruction/acute cholecystitis.









Electronically signed by:  Howie Means M.D.

4/17/2017 10:00 AM



Dictated Date/Time:  4/17/2017 9:58 AM

## 2017-04-17 NOTE — DIAGNOSTIC IMAGING REPORT
INTRAOPERATIVE CHOLANGIOGRAM 



HISTORY: Post cholecystectomy.



FLUOROSCOPY TIME: 2 seconds.



FINDINGS: Fluoroscopy was provided for an intraoperative cholangiogram status

post cholecystectomy. Contrast was injected through the cystic duct remnant. The

common bile duct is normal in course and caliber. There are no filling defects

seen within the common bile duct to suggest a retained stone.  Contrast extends

into the small bowel. There is no intrahepatic bile duct dilatation.



IMPRESSION: Fluoroscopy provided for an intraoperative cholangiogram status post

cholecystectomy. No filling defects within the common bile duct.







Electronically signed by:  Howie Means M.D.

4/17/2017 12:04 PM



Dictated Date/Time:  4/17/2017 12:04 PM

## 2017-04-17 NOTE — [facility] POST OPERATIVE BRIEF NOTE
Immediate Operative Summary


Operative Date


Apr 17, 2017.





Pre-Operative Diagnosis





acute cholecystitis cholelithiasis





Post-Operative Diagnosis





Same





Procedure(s) Performed





Laparascopic cholecystecomy with operative cholangiogram





Surgeon


Dr Recinos





Assistant Surgeon(s)


Ariel Nair Pa-C





Estimated Blood Loss


5 ML





Findings


acute and chronic cholecystitis





Specimens





a. gallbladder





Cultures-anaerobic and routine cultures of gallbladder bed

## 2017-04-17 NOTE — ANESTHESIOLOGY PROGRESS NOTE
Anesthesia Post Op Note


Date & Time


Apr 17, 2017 at 13:25





Vital Signs


Pain Intensity:  0





 Vital Signs Past 12 Hours








  Date Time  Temp Pulse Resp B/P Pulse Ox O2 Delivery O2 Flow Rate FiO2


 


4/17/17 13:20  74 16 104/76 98 Nasal Cannula 2 


 


4/17/17 13:10 36.8 74 15 110/80 95 Nasal Cannula 2 


 


4/17/17 13:00  87 12 113/69 97 Nasal Cannula 2 


 


4/17/17 12:50  82 12 127/76 100 Mask 10 


 


4/17/17 12:40  99 16 133/91 99 Mask 10 


 


4/17/17 12:34 36.1 101 12 115/84 99 Mask 10 


 


4/17/17 09:58  80 16 146/83  Room Air  


 


4/17/17 07:46  79 16 103/77 95   


 


4/17/17 06:13  78 20 115/70 95 Room Air  


 


4/17/17 05:02  74 20 137/79 94 Room Air  


 


4/17/17 03:27 36.4 66 18 134/85 95 Room Air  











Notes


Mental Status:  alert / awake / arousable, participated in evaluation


Pt Amnestic to Procedure:  Yes


Nausea / Vomiting:  adequately controlled


Pain:  adequately controlled


Airway Patency, RR, SpO2:  stable & adequate


BP & HR:  stable & adequate, see Notes


Hydration State:  stable & adequate


Anesthetic Complications:  no major complications apparent


Pt had laparoscopic cholecystectomy under [facility]. Upon emergence, pt developed 

modest HTN and tachycardia which was noted to be atrial fibrillation with rapid 

ventricular rate. Previously pt was in sinus rhythm. Was treated with labetalol 

5 mg.  Pt was awake and stable with ventricular rate 70's in PACU.  Discussed 

with cardiologist Dr. Vilchis who agreed to evaluate pt for new onset atrial 

fibrillation.  Pt will be transferred to telemetry bed for observation pending 

cardiologist evaluation.

## 2017-04-17 NOTE — HISTORY AND PHYSICAL
History & Physical


Date & Time of Service:


Apr 17, 2017 at 06:55


Chief Complaint:


Stomach Pain,Bloating,Gas


Primary Care Physician:


Angel Padron MD


History of Present Illness


Source:  patient, spouse, clinic records, hospital records


76 year old male with PMH of melanoma, HTN, tubulovillous adenoma s/p right 

hemicolectomy presents to the Emergency Room with complaints of persistent mid 

abdominal pain that started last night around 11pm.  Pt said that the pain was 8

/10, non radiating associated with bloating. he said that he ate out last 

night. He said that he took so gasX that seems to help a little bit. He 

recently had hemicolectomy done about 6month ago for a precancerous polyps. He 

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 The 

abdominal pain did not associated with nausea, vomiting and diarrhea. Pt said 

that he has stones in his gallbladder but never cause him any problem. He 

received fentanyl and cefoxitin in the ER. At present pt is free of pain. 

Denies any chest pain, palpitation, dizziness and sob.





Social History


Smoking Status:  Never Smoker


Marital Status:  


Occupational Status:  retired





Multi-Drug Resistant Organisms


History of MDRO:  No





Allergies


Coded Allergies:  


     No Known Allergies (Verified , 4/17/17)





Home Medications


Scheduled


Ascorbic Acid (Vitamin C), 1,000 MG PO DAILY


Aspirin (Aspirin 81), 81 MG PO DAILY


Cranberry (Vaccinium Macrocarp (Cranberry Fruit), 810 MG PO TID


Finasteride (Propecia), 1 MG PO DAILY


Fish Oil ([place]-3), 1 CAP PO DAILY


Hydrochlorothiazide (Hydrochlorothiazide), 12.5 MG PO QAM


Loratadine (Claritin), 10 MG PO DAILY


Metoprolol Succ (Toprol Xl) (Toprol-Xl), 25 MG PO HS


Multivitamin (Multivitamin), 1 TAB PO DAILY


Probiotic Product (Probiotic), PO DAILY


Tamsulosin Hcl (Flomax), 0.4 MG PO DAILY





Miscellaneous Medications


Glucosamine-Chondroitin-Hyalur (Move Free Joint Health Ad), 1 TAB PO





Review of Systems


Constitutional:  No chills, No fever, No sweats


Eyes:  No eye pain, No worsening of vision


ENT:  No nasal symptoms, No unusual epistaxis


Respiratory:  No cough, No shortness of breath, No sputum, No wheezing


Cardiovascular:  No chest pain, No claudication, No edema


Abdomen:  + pain, No diarrhea, No nausea, No vomiting (mid abdominal, bloating)


Genitourinary - Male:  No dysuria, No hematuria


Neurologic:  No paralysis, No weakness


Psychiatric:  No anxiety, No substance abuse


Endocrine:  No excessive thirst


Hematologic / Lymphatic:  No night sweats


Integumentary:  No itch, No rash





Physical Exam


Vital Signs











  Date Time  Temp Pulse Resp B/P Pulse Ox O2 Delivery O2 Flow Rate FiO2


 


4/17/17 06:13  78 20 115/70 95 Room Air  


 


4/17/17 05:02  74 20 137/79 94 Room Air  


 


4/17/17 03:27 36.4 66 18 134/85 95 Room Air  








General Appearance:  WD/WN, no apparent distress


Head:  normocephalic, atraumatic


Eyes:  normal inspection, PERRL, EOMI


ENT:  normal ENT inspection, hearing grossly normal


Neck:  supple, no JVD


Respiratory/Chest:  lungs clear, normal breath sounds, no respiratory distress


Cardiovascular:  regular rate, rhythm, no edema, no gallop, no JVD


Abdomen/GI:  normal bowel sounds, non tender, soft, + pertinent finding (

healing abdominal scars )


Back:  normal inspection, no CVA tenderness


Extremities/Musculoskelatal:  normal inspection, no calf tenderness, no pedal 

edema


Neurologic/Psych:  no motor/sensory deficits, alert, normal mood/affect, 

oriented x 3


Skin:  normal color, warm/dry





Diagnostics


Laboratory Results





Results Past 24 Hours








Test


  4/17/17


03:50 4/17/17


03:57 4/17/17


04:00 Range/Units


 


 


White Blood Count 8.39   4.8-10.8  K/uL


 


Red Blood Count 4.92   4.7-6.1  M/uL


 


Hemoglobin 15.8   14.0-18.0  g/dL


 


Hematocrit 44.9   42-52  %


 


Mean Corpuscular Volume 91.3     fL


 


Mean Corpuscular Hemoglobin 32.1   25-34  pg


 


Mean Corpuscular Hemoglobin


Concent 35.2


  


  


  32-36  g/dl


 


 


Platelet Count 211   130-400  K/uL


 


Mean Platelet Volume 10.8   7.4-10.4  fL


 


Neutrophils (%) (Auto) 77.3    %


 


Lymphocytes (%) (Auto) 13.7    %


 


Monocytes (%) (Auto) 8.0    %


 


Eosinophils (%) (Auto) 0.8    %


 


Basophils (%) (Auto) 0.1    %


 


Neutrophils # (Auto) 6.48   1.4-6.5  K/uL


 


Lymphocytes # (Auto) 1.15   1.2-3.4  K/uL


 


Monocytes # (Auto) 0.67   0.11-0.59  K/uL


 


Eosinophils # (Auto) 0.07   0-0.5  K/uL


 


Basophils # (Auto) 0.01   0-0.2  K/uL


 


RDW Standard Deviation 47.1   36.4-46.3  fL


 


RDW Coefficient of Variation 13.9   11.5-14.5  %


 


Immature Granulocyte % (Auto) 0.1    %


 


Immature Granulocyte # (Auto) 0.01   0.00-0.02  K/uL


 


Sodium Level 141   136-145  mmol/L


 


Potassium Level 4.1   3.5-5.1  mmol/L


 


Chloride Level 103     mmol/L


 


Carbon Dioxide Level 32   21-32  mmol/L


 


Anion Gap 6.0 18.0  16-25  mmol/L


 


Blood Urea Nitrogen 24   7-18  mg/dl


 


Creatinine


  1.10


  


  


  0.60-1.40


mg/dl


 


Est Creatinine Clear Calc


Drug Dose 56.3


  


  


   ml/min


 


 


Estimated GFR (


American) 74.7


  


  


   


 


 


Estimated GFR (Non-


American 64.4


  


  


   


 


 


BUN/Creatinine Ratio 22.0   10-20  


 


Random Glucose 132   70-99  mg/dl


 


Calcium Level 8.6   8.5-10.1  mg/dl


 


Total Bilirubin 0.4   0.2-1  mg/dl


 


Direct Bilirubin 0.1   0-0.2  mg/dl


 


Aspartate Amino Transf


(AST/SGOT) 20


  


  


  15-37  U/L


 


 


Alanine Aminotransferase


(ALT/SGPT) 26


  


  


  12-78  U/L


 


 


Alkaline Phosphatase 94     U/L


 


Troponin I < 0.015   0-0.045  ng/ml


 


Total Protein 7.4   6.4-8.2  gm/dl


 


Albumin 3.7   3.4-5.0  gm/dl


 


Lipase 205     U/L


 


Bedside Hemoglobin  16.3  14.0-18.0  g/dl


 


Bedside Hematocrit  48  42-52  %


 


Bedside Sodium  138  135-144  mEq/L


 


Bedside Potassium  3.9  3.3-5.0  mEq/L


 


Bedside Chloride  98  101-112  mEq/L


 


Bedside Total CO2  26  24-31  mEq/l


 


Bedside Blood Urea Nitrogen  26  7-18  mg/dl


 


Bedside Creatinine  0.9  0.6-1.3  mg/dl


 


Bedside Glucose (other)  132  70-99  mg/dl


 


Bedside Ionized Calcium (Jayme)


  


  1.16


  


  1.12-1.32


mmol/l


 


Urine Color   YELLOW  


 


Urine Appearance   CLEAR CLEAR  


 


Urine pH   5.0 4.5-7.5  


 


Urine Specific Gravity   1.025 1.000-1.030  


 


Urine Protein   NEG NEG  


 


Urine Glucose (UA)   NEG NEG  


 


Urine Ketones   NEG NEG  


 


Urine Occult Blood   NEG NEG  


 


Urine Nitrite   NEG NEG  


 


Urine Bilirubin   NEG NEG  


 


Urine Urobilinogen   NEG NEG  


 


Urine Leukocyte Esterase   NEG NEG  


 


Urine WBC (Auto)   1-5 0-5  /hpf


 


Urine RBC (Auto)   0-4 0-4  /hpf


 


Urine Hyaline Casts (Auto)   0 0-5  /lpf


 


Urine Epithelial Cells (Auto)   5-10 0-5  /lpf


 


Urine Bacteria (Auto)   NEG NEG  











Diagnostic Radiology


T Abdomen and Pelvis: 





Distended gallbladder with gallstones.  Ultrasound may be considered if there 

is concern for cholecystitis.


Fluid in small bowel loops and areas of questionable mild wall thickening and 

nonspecific mesenteric stranding.  Correlate clinically for enteritis.  No 

significant bowel distension to suggest obstruction.  Moderate amount of stool 

in the colon.  Scattered colonic diverticula.  Prior bowel surgery noted.  Mild 

bladder wall thickening or underdistention.  Consider urinalysis if there is 

concern for cystitis.  Nonspecific perinephric stranding.  Renal parapelvic 

cysts.  No hydronephrosis.


Small bilateral fat-containing inguinal hernias with fluid in the right 

inguinal hernia.





Impression


Assessment and Plan


Abdominal Pain Associated With bloating


Possible related to indigestion vs cholecystitis


CT abdomen showed gallbladder thickening


No elevated WBC, afebrile, LFT wnl


Received IV cefoxitin in the ER


Asymptomatic at this time


does not want to get surgery unless if needed at this time (has a trip plan for 

next week)


Will get an HIDA scan


will keep him NPO 


Case discussed with surgery Dr. Ceballos 





HTN 


Continue metoprolol


continue monitor BP


Hold HCTZ





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


Stable





DVT px SCD (for possible surgery)





CODE STATUS FULL CODE





Level of Care


Med/Surg





Resuscitation Status


FULL RESUSCITATION





VTE Prophylaxis


VTE Risk Assessment Done? Y/N:  Yes


Risk Level:  Moderate


Given or contraindicated:  SCD's

## 2017-04-18 VITALS
DIASTOLIC BLOOD PRESSURE: 61 MMHG | SYSTOLIC BLOOD PRESSURE: 142 MMHG | TEMPERATURE: 98.78 F | HEART RATE: 79 BPM | OXYGEN SATURATION: 93 %

## 2017-04-18 VITALS
SYSTOLIC BLOOD PRESSURE: 139 MMHG | OXYGEN SATURATION: 96 % | HEART RATE: 76 BPM | DIASTOLIC BLOOD PRESSURE: 63 MMHG | TEMPERATURE: 98.42 F

## 2017-04-18 VITALS
SYSTOLIC BLOOD PRESSURE: 139 MMHG | TEMPERATURE: 98.42 F | DIASTOLIC BLOOD PRESSURE: 63 MMHG | HEART RATE: 76 BPM | OXYGEN SATURATION: 96 %

## 2017-04-18 LAB
ANION GAP SERPL CALC-SCNC: 8 MMOL/L (ref 3–11)
BUN SERPL-MCNC: 15 MG/DL (ref 7–18)
BUN/CREAT SERPL: 15.3 (ref 10–20)
CALCIUM SERPL-MCNC: 7.8 MG/DL (ref 8.5–10.1)
CHLORIDE SERPL-SCNC: 107 MMOL/L (ref 98–107)
CO2 SERPL-SCNC: 26 MMOL/L (ref 21–32)
CREAT CL PREDICTED SERPL C-G-VRATE: 61.9 ML/MIN
CREAT SERPL-MCNC: 1 MG/DL (ref 0.6–1.4)
EOSINOPHIL NFR BLD AUTO: 200 K/UL (ref 130–400)
GLUCOSE SERPL-MCNC: 128 MG/DL (ref 70–99)
HCT VFR BLD CALC: 39.2 % (ref 42–52)
MAGNESIUM SERPL-MCNC: 2 MG/DL (ref 1.8–2.4)
MCH RBC QN AUTO: 31.8 PG (ref 25–34)
MCHC RBC AUTO-ENTMCNC: 34.7 G/DL (ref 32–36)
MCV RBC AUTO: 91.6 FL (ref 80–100)
PMV BLD AUTO: 11 FL (ref 7.4–10.4)
POTASSIUM SERPL-SCNC: 4.2 MMOL/L (ref 3.5–5.1)
RBC # BLD AUTO: 4.28 M/UL (ref 4.7–6.1)
SODIUM SERPL-SCNC: 141 MMOL/L (ref 136–145)
TSH SERPL-ACNC: 0.49 UIU/ML (ref 0.3–4.5)
WBC # BLD AUTO: 7.17 K/UL (ref 4.8–10.8)

## 2017-04-18 RX ADMIN — SODIUM CHLORIDE SCH MLS/HR: 900 INJECTION, SOLUTION INTRAVENOUS at 05:39

## 2017-04-18 RX ADMIN — ALUMINUM ZIRCONIUM TRICHLOROHYDREX GLY SCH EA: 0.2 STICK TOPICAL at 00:00

## 2017-04-18 RX ADMIN — CEFOXITIN SODIUM SCH MLS/HR: 1 POWDER, FOR SOLUTION INTRAVENOUS at 05:34

## 2017-04-18 RX ADMIN — AMIODARONE HYDROCHLORIDE SCH MG: 200 TABLET ORAL at 09:41

## 2017-04-18 RX ADMIN — ALUMINUM ZIRCONIUM TRICHLOROHYDREX GLY SCH EA: 0.2 STICK TOPICAL at 08:00

## 2017-04-18 NOTE — DISCHARGE SUMMARY
Discharge Summary


Date of Service


Apr 18, 2017.





Discharge Summary


Admission Date:


Apr 17, 2017 at 06:49


Discharge Date:  Apr 18, 2017


Discharge Disposition:  Home


Principal Diagnosis:


Cholecystitis


Secondary Diagnoses/Problems:


Atrial fibrillation


Procedures:


CT a/p


1. Nonobstructive bowel pattern post partial colectomy.


2. The gallbladder demonstrates internal septations versus gallstones, as well 

as what is suggestive of a trace amount of pericholecystic edematous change.


3. Right upper quadrant ultrasound suggested as follow-up.


4. Nonobstructive bowel pattern. 





RUQ ultrasound


1. Multiple gallstones with mild gallbladder wall thickening. This is 

concerning for acute cholecystitis. Clinical correlation recommended.


2. Normal caliber common bile duct.


3. The pancreas was obscured by overlying bowel gas.





Hepatobiliary scan


The gallbladder is not identified. Therefore, this is consistent with cystic 

duct obstruction/acute cholecystitis.





Cholangiogram


Fluoroscopy provided for an intraoperative cholangiogram status post 

cholecystectomy. No filling defects within the common bile duct.





Laparoscopic cholecystectomy and intraoperative cholangiogram 





TTE


* Normal LV chamber size with borderline concentric LVH.


* Normal LV systolic function, 55-60%.


* No segmental left ventricular wall motion abnormalities are noted.


* No significant valvular pathology.


Consultations:


General surgery


Cardiology





Medication Reconciliation


New Medications:  


Amiodarone HCl (Amiodarone HCl) 200 Mg Tab


200 MG PO BID for 30 Days, #60 TAB





 


Continued Medications:  


Ascorbic Acid (Vitamin C) 1,000 Mg Tab


1000 MG PO DAILY





Aspirin (Aspirin 81) 81 Mg Tab


81 MG PO DAILY





Cranberry (Vaccinium Macrocarp (Cranberry Fruit) 405 Mg Cap


810 MG PO TID





Finasteride (Propecia) 1 Mg Tab


1 MG PO DAILY, TAB





Fish Oil (Omega-3) 1 Ea Cap


1 CAP PO DAILY, CAP





Glucosamine-Chondroitin-Hyalur (Move Free Joint Health Ad) 1 Tab Tab


1 TAB PO





Hydrochlorothiazide (Hydrochlorothiazide) 12.5 Mg Tab


12.5 MG PO QAM for 90 Days, #90 TAB 3 Refills





Loratadine (Claritin) 10 Mg Tab


10 MG PO DAILY, TAB





Metoprolol Succ (Toprol Xl) (Toprol-Xl) 25 Mg Tabcr


25 MG PO HS, #30 TAB





Multivitamin (Multivitamin)  Tab


1 TAB PO DAILY, TAB





Probiotic Product (Probiotic) 1 Tab Tab


PO DAILY





Tamsulosin Hcl (Flomax) 0.4 Mg Cap


0.4 MG PO DAILY, CAP











Admission Information


HPI (per Admitting provider):


76 year old male with PMH of melanoma, HTN, tubulovillous adenoma s/p right 

hemicolectomy presents to the Emergency Room with complaints of persistent mid 

abdominal pain that started last night around 11pm.  Pt said that the pain was 8

/10, non radiating associated with bloating. he said that he ate out last 

night. He said that he took so gasX that seems to help a little bit. He 

recently had hemicolectomy done about 6month ago for a precancerous polyps. He 

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 The 

abdominal pain did not associated with nausea, vomiting and diarrhea. Pt said 

that he has stones in his gallbladder but never cause him any problem. He 

received fentanyl and cefoxitin in the ER. At present pt is free of pain. 

Denies any chest pain, palpitation, dizziness and sob.


Physical Exam (per Admitting):  


   General Appearance:  WD/WN, no apparent distress


   Head:  normocephalic, atraumatic


   Eyes:  normal inspection, PERRL, EOMI


   ENT:  normal ENT inspection, hearing grossly normal


   Neck:  supple, no JVD


   Respiratory/Chest:  lungs clear, normal breath sounds, no respiratory 

distress


   Cardiovascular:  regular rate, rhythm, no edema, no gallop, no JVD


   Abdomen/GI:  normal bowel sounds, non tender, soft, + pertinent finding (

healing abdominal scars )


   Back:  normal inspection, no CVA tenderness


   Extremities/Musculoskelatal:  normal inspection, no calf tenderness, no 

pedal edema


   Neurologic/Psych:  no motor/sensory deficits, alert, normal mood/affect, 

oriented x 3


   Skin:  normal color, warm/dry





Hospital Course


76 year old male with history of hypertension and BPH who presented with 

abdominal pain. Imaging was consistent with acute cholecystitis. General 

surgery was consulted and patient underwent laparoscopic cholecystectomy. 

Patient received braulio-operative cefoxitin. Post-operative course was 

complicated by A fib with RVR. Cardiology was consulted. TTE was unremarkable. 

Patient was started on amiodarone IV. This was transitioned to PO and was 

continued upon discharge. Patient will follow up with Cardiology to determine 

discontinuation of Amiodarone. Patient was continued on the remainder of his 

home medications. Patient deemed stable for discharge with Family Medicine, 

General surgery and Cardiology follow up.  





PE on discharge:


General- awake; alert; NAD


Eyes- EOMI; no scleral icterus


Neck- no stridor; trachea midline


Lungs- CTA bilaterally; no wheezes/crackles


Heart- RRR; no m/r/g


Abdomen- soft; NTND; nBS; incision sites c/d/i


Back- no gross abnormalities


Extremities- no c/c/e; no deformity


Neuro- no focal deficits


Skin- no appreciable rash or bruise


.


Total time spent on discharge = 


This includes examination of the patient, discharge planning, medication 

reconciliation, and communication with other providers.





Discharge Instructions


Discharge Instructions


Date of Service


Apr 18, 2017.





Admission


Reason for Admission:  Thickening Of Wall Of Gallbladder





Discharge


Discharge Diagnosis / Problem:  Cholecystitis. Atrial fibrillation.





Discharge Goals


Goal(s):  Improve disease control, Therapeutic intervention





Activity Recommendations


Activity Limitations:  resume your previous activity





.





Instructions / Follow-Up


Instructions / Follow-Up


Please follow up with Family Medicine Dr. Howard on April 21st at 12:45pm (Dr. Padron does not have availability on Friday or Monday).


Please follow up with General Surgery Dr. Kenney on April 24th as 

instructed.


Please follow up with Cardiology Dr. Vilchis on May 8th at 9:45am.





Current Hospital Diet


Patient's current hospital diet: AHA Diet (Heart Healthy)





Discharge Diet


Recommended Diet:  AHA Diet (Heart Healthy)





Procedures


Procedures Performed:  


Laparascopic cholecystecomy with operative cholangiogram





Pending Studies


Studies pending at discharge:  yes


List of pending studies:  


Gallbladder pathology.





Medical Emergencies








.


Who to Call and When:





Medical Emergencies:  If at any time you feel your situation is an emergency, 

please call 911 immediately.





.





Non-Emergent Contact


Non-Emergency issues call your:  Primary Care Provider





.


.





"Provider Documentation" section prepared by Sameera Flor.





VTE Core Measure


Inpt VTE Proph given/why not?:  SCD's





Additional Copies To


Angel Padron MD RAJ, Anitha ., M.D.

## 2017-04-18 NOTE — ECHOCARDIOGRAM REPORT
*NOTICE TO RECEIVING PARTY AGENCY**  This information is strictly Confidential and protected under 
[place] law.  [place] law prohibits you from making any further disclosure of this 
information unless further disclosure is expressly permitted by the written consent of the person to 
whom it pertains or is authorized by law.  A general authorization for the release of medical or 
other information is not sufficient for this purpose.  Hospital accepts no responsibility if the 
information is made available to any other person, INCLUDING THE PATIENT.



Interpretation Summary

  *  Name: RICHARD LEY  Study Date: 2017 03:19 PM  BP: 122/79 mmHg

  *  MRN: [id number]  Patient Location: C.2E\S\E204\S\1  HR: 133

  *  : 1940 (M/d/yyyy)  Gender: Male  Height: 69 in

  *  Age: 76 yrs  Ethnicity: [place]  Weight: 178 lb

  *  Ordering Physician: Phuc Vilchis

  *  Referring Physician: Self, Referred

  *  Performed By: Marisol Duncan RDCS

  *  Accession# [id number]  Account# [id number]

  *  Reason For Study: AFIB

  *  BSA: 2.0 m2

  *  -- Conclusions --

  *  Normal LV chamber size with borderline concentric LVH.

  *  Normal LV systolic function, 55-60%.

  *  No segmental left ventricular wall motion abnormalities are noted.

  *  No significant valvular pathology.

Procedure Details

  *  A contrast injection of Definity was performed to improve assessment of LV function.

  *  Contrast was injected into an intravenous site in the right arm.

  *  One vial of Definity ultrasound contrast was diluted in normal saline to a total volume of 10 
ml.  A total of '2' ml of solution was administered during imaging.

  *  Lot # [id number] of Definity utilized for procedure.

  *  Expiration date 1 .

  *  The attending nurse who injected the contrast agent was VADIM ZUÑIGA RN.

Left Ventricle

  *  The left ventricle is normal in size.

  *  There is borderline concentric left ventricular hypertrophy.

  *  Ejection Fraction = 55-60%.

  *  Left ventricular systolic function is normal.

  *  No segmental left ventricular wall motion abnormalities are noted.

  *  The left ventricular wall motion is normal.

Right Ventricle

  *  The right ventricular cavity size is normal (basal dimension <4.2 cm in right ventricular 
apical 4-chamber view).

  *  The right ventricular systolic function is normal as assessed by tricuspid annular plane 
systolic excursion (TAPSE) (normal >1.5 cm).

Atria

  *  The left atrial size is normal.

  *  Right atrial size is normal.

  *  No ASD detected; PFO is not assessed.

Mitral Valve

  *  The mitral valve is normal in structure and function.

Tricuspid Valve

  *  The tricuspid valve is normal in structure and function.

Aortic Valve

  *  The aortic valve is not well visualized.

  *  No hemodynamically significant valvular aortic stenosis.

  *  There is no significant aortic regurgitation.

Pulmonic Valve

  *  The pulmonary valve is not well seen, but the Doppler examination is normal without significant 
regurgitation or stenosis.

Great Vessels

  *  The aortic root is normal size.

Pericardium/Pleural

  *  There is no pericardial effusion.



MMode 2D Measurements and Calculations

IVSd 1.1 cm

IVSs 1.7 cm



LVIDd 5.1 cm

LVIDs 3.5 cm

LVPWd 0.99 cm

LVPWs 1.5 cm



IVS/LVPW 1.1 

FS 31.6 %

EDV(Teich) 121.1 ml

ESV(Teich) 49.2 ml

EF(Teich) 59.3 %



EDV(cubed) 128.9 ml

ESV(cubed) 41.2 ml

EF(cubed) 68.1 %

% IVS thick 56.1 %

% LVPW thick 48.6 %





LV mass(C)d 193.6 grams

LV mass(C)dI 98.5 grams/m\S\2

LV mass(C)s 206.4 grams

LV mass(C)sI 105.0 grams/m\S\2



SV(Teich) 71.8 ml

SI(Teich) 36.5 ml/m\S\2

SV(cubed) 87.7 ml

SI(cubed) 44.6 ml/m\S\2



LVAd ap4 29.9 cm\S\2

LVLd ap4 7.7 cm

EDV(MOD-sp4) 97.1 ml

LVAs ap4 16.3 cm\S\2

LVLs ap4 6.1 cm

ESV(MOD-sp4) 38.3 ml

EF(MOD-sp4) 60.6 %



LVAd ap2 18.2 cm\S\2

LVLd ap2 6.9 cm

EDV(MOD-sp2) 39.6 ml

LVAs ap2 11.0 cm\S\2

LVLs ap2 6.5 cm

ESV(MOD-sp2) 15.5 ml

EF(MOD-sp2) 60.9 %





SV(MOD-sp4) 58.8 ml

SI(MOD-sp4) 29.9 ml/m\S\2



SV(MOD-sp2) 24.1 ml

SI(MOD-sp2) 12.3 ml/m\S\2











Doppler Measurements and Calculations

Ao V2 max 133.6 cm/sec

Ao max PG 7.1 mmHg

Ao max PG (full) 3.3 mmHg



LV V1 max PG 3.8 mmHg



LV V1 max 98.0 cm/sec

## 2017-04-18 NOTE — SURGERY PROGRESS NOTE
DATE: 04/18/2017

 

Km is alert, coherent, in no distress, having no abdominal discomfort. 

He is first day status post laparoscopic cholecystectomy, intraoperative

cholangiogram, apparently went into atrial fibrillation in the recovery room

and was transferred to a monitored bed.  He has had a prior history of atrial

fibrillation years ago, was evaluated by cardiology at that time.  The

medical service is actually addressing this issue at this time. 

Intraoperative findings were discussed with the patient.  His last vitals

showed a temperature of 37.1, pulse 79, respirations 18, blood pressure

142/61, O2 sats 93 on room air.  I\T\O, he had 275 urine overnight.  Laboratory

wise this morning, his BUN is 15, creatinine 1.0, hemoglobin is 13.6, WBC is

7.17.  His abdomen is completely benign.  The most discomfort is in the

epigastric area where we had to enlarge that incision to get about a 2 cm

stone out of the abdomen.  At this point, from my point of view, the patient

can be discharged at the discretion of primary service.  I would like to see

him in the office next Monday.  He is planning to go to [place] for

graduation on Tuesday.  I see no reason not for him to go except that I

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

frequently.  Regarding his activity, no real limitation of activity.  He

could probably drive but do not take any prescription analgesic meds.  He may

shower and diet as tolerated.

## 2017-04-18 NOTE — DISCHARGE INSTRUCTIONS
Discharge Instructions


Date of Service


Apr 18, 2017.





Admission


Reason for Admission:  Thickening Of Wall Of Gallbladder





Discharge


Discharge Diagnosis / Problem:  Cholecystitis. Atrial fibrillation.





Discharge Goals


Goal(s):  Improve disease control, Therapeutic intervention





Activity Recommendations


Activity Limitations:  resume your previous activity





.





Instructions / Follow-Up


Instructions / Follow-Up


Please follow up with Family Medicine Dr. Howard on April 21st at 12:45pm (Dr. Padron does not have availability on Friday or Monday).


Please follow up with General Surgery Dr. Kenney on April 24th as 

instructed.


Please follow up with Cardiology Dr. Vilchis on May 8th at 9:45am.





Current Hospital Diet


Patient's current hospital diet: AHA Diet (Heart Healthy)





Discharge Diet


Recommended Diet:  AHA Diet (Heart Healthy)





Procedures


Procedures Performed:  


Laparascopic cholecystecomy with operative cholangiogram





Pending Studies


Studies pending at discharge:  yes


List of pending studies:  


Gallbladder pathology.





Medical Emergencies








.


Who to Call and When:





Medical Emergencies:  If at any time you feel your situation is an emergency, 

please call 911 immediately.





.





Non-Emergent Contact


Non-Emergency issues call your:  Primary Care Provider





.


.





"Provider Documentation" section prepared by Sameera Flor.





VTE Core Measure


Inpt VTE Proph given/why not?:  SCD's

## 2017-04-18 NOTE — ANESTHESIOLOGY PROGRESS NOTE
Anesthesia Post Op Note


Date & Time


Apr 18, 2017 at 07:52





Vital Signs


Pain Intensity:  0.0





 Vital Signs Past 12 Hours








  Date Time  Temp Pulse Resp B/P Pulse Ox O2 Delivery O2 Flow Rate FiO2


 


4/18/17 07:51 36.9 76 18 139/63 96   


 


4/18/17 04:00      Room Air  


 


4/18/17 03:35 37.1 79 18 142/61 93 Room Air  


 


4/18/17 00:01      Room Air  


 


4/17/17 22:55 37.3 83 18 117/75 94 Room Air  











Notes


Mental Status:  alert / awake / arousable, participated in evaluation


Pt Amnestic to Procedure:  Yes


Nausea / Vomiting:  adequately controlled


Pain:  adequately controlled


Airway Patency, RR, SpO2:  stable & adequate


BP & HR:  stable & adequate


Hydration State:  stable & adequate


Anesthetic Complications:  no major complications apparent

## 2017-04-18 NOTE — CARDIOLOGY FOLLOW-UP
Subjective


Subjective


Date of Service:


Apr 18, 2017.


Pt evaluation today including:  conversation w/ patient, physical exam, chart 

review, lab review, review of studies, review of inpatient medication list


Additional Details:


Pt seen and examined, oob in chair. States that he feels great. No complaints. 

Denies cp, sob, palpitations, lightheadedness or dizziness.





Tele reviewed: quickly converted to sinus with amio bolus, remained sinus 

overnight.





Problem List


Medical Problems:


(1) Dilated gallbladder


Status: Acute  





(2) Epigastric abdominal pain


Status: Acute  





(3) Gallstones


Status: Acute  





(4) Thickening of wall of gallbladder


Status: Acute  











Review of Systems


Respiratory:  No cough, No dyspnea at rest, No dyspnea on exertion, No 

hemoptysis, No problem reported, No see HPI, No shortness of breath, No sputum, 

No wheezing


Cardiac:  No PND, No chest pain, No claudication, No edema, No orthopnea, No 

palpitations, No problem reported, No see HPI





Objective


Vital Signs





Last Vital Signs Documentation








  Date Time  Temp Pulse Resp B/P Pulse Ox O2 Delivery O2 Flow Rate FiO2


 


4/18/17 08:00      Room Air  


 


4/18/17 07:51 36.9 76 18 139/63 96   


 


4/17/17 14:00       2.0 











Physical Exam:


General Appearance:  WD/WN, no apparent distress


Eyes:  bilateral eyes EOMI, bilateral eyes PERRL, bilateral eyes normal 

inspection


ENT:  normal ENT inspection, hearing grossly normal, pharynx normal


Neck:  supple, no adenopathy, thyroid normal, no JVD, no carotid bruits, 

trachea midline


Respiratory/Chest:  chest non-tender, lungs clear, normal breath sounds, no 

respiratory distress, no accessory muscle use


Cardiovascular:  regular rate, rhythm, no edema, no JVD, no murmur, + gallop/S4


Abdomen:  normal bowel sounds, non tender, soft, no organomegaly, no pulsatile 

mass


Extremities:  normal inspection, no pedal edema, no calf tenderness


Neurologic/Psychiatric:  CNs II-XII nml as tested, no motor/sensory deficits, 

alert, normal mood/affect, oriented x 3


Skin:  normal color, warm/dry, no rash


Lymphatic:  no adenopathy





Assessment and Plan


1. Paroxysmal afib


   in afib for only a few hours


   quickly cardioverted with amio, has since been on po


   no need for anticoagulation given clinical context and short interval


   would d/c to home on outpatient doses of aspirin and metoprolol


   also amio 200mg po bid


   my office will arrange follow up with me in 3-4 weeks when we will likely d/

c amio





ok to d/c to home from cardiac standpoint.

## 2018-03-22 LAB
BASOPHILS # BLD: 0.02 K/UL (ref 0–0.2)
BASOPHILS NFR BLD: 0.4 %
BUN SERPL-MCNC: 21 MG/DL (ref 7–18)
CALCIUM SERPL-MCNC: 8.7 MG/DL (ref 8.5–10.1)
CO2 SERPL-SCNC: 28 MMOL/L (ref 21–32)
CREAT SERPL-MCNC: 0.97 MG/DL (ref 0.6–1.4)
EOS ABS #: 0.08 K/UL (ref 0–0.5)
EOSINOPHIL NFR BLD AUTO: 255 K/UL (ref 130–400)
GLUCOSE SERPL-MCNC: 125 MG/DL (ref 70–99)
HCT VFR BLD CALC: 41.5 % (ref 42–52)
HGB BLD-MCNC: 14.3 G/DL (ref 14–18)
IG#: 0.01 K/UL (ref 0–0.02)
IMM GRANULOCYTES NFR BLD AUTO: 27.1 %
INR PPP: 1 (ref 0.9–1.1)
LYMPHOCYTES # BLD: 1.33 K/UL (ref 1.2–3.4)
MCH RBC QN AUTO: 31.6 PG (ref 25–34)
MCHC RBC AUTO-ENTMCNC: 34.5 G/DL (ref 32–36)
MCV RBC AUTO: 91.6 FL (ref 80–100)
MONO ABS #: 0.5 K/UL (ref 0.11–0.59)
MONOCYTES NFR BLD: 10.2 %
NEUT ABS #: 2.96 K/UL (ref 1.4–6.5)
NEUTROPHILS # BLD AUTO: 1.6 %
NEUTROPHILS NFR BLD AUTO: 60.5 %
PMV BLD AUTO: 11.1 FL (ref 7.4–10.4)
POTASSIUM SERPL-SCNC: 4.3 MMOL/L (ref 3.5–5.1)
PTT PATIENT: 27.1 SECONDS (ref 21–31)
RED CELL DISTRIBUTION WIDTH CV: 14.1 % (ref 11.5–14.5)
RED CELL DISTRIBUTION WIDTH SD: 47.6 FL (ref 36.4–46.3)
SODIUM SERPL-SCNC: 137 MMOL/L (ref 136–145)
WBC # BLD AUTO: 4.9 K/UL (ref 4.8–10.8)

## 2018-03-22 NOTE — DIAGNOSTIC IMAGING REPORT
CHEST PREADMISSION(PA/LAT)



HISTORY:      Preop.



COMPARISON: None.



FINDINGS: The lungs are clear. Cardiac silhouette is normal in size. No pleural

effusions. No pneumothorax.



IMPRESSION:

No acute process.







Electronically signed by:  Howie Means M.D.

3/22/2018 2:49 PM



Dictated Date/Time:  3/22/2018 2:47 PM

## 2018-03-22 NOTE — PAT MEDICATION INSTRUCTIONS
Service Date


Mar 22, 2018.





Current Home Medication List


Ascorbic Acid (Vitamin C), 1,000 MG PO DAILY AFTERNOON


Aspirin (Aspirin 81), 81 MG PO HS


Finasteride (Proscar), 5 MG PO QAM


Fish Oil ([place]-3), 1 CAP PO DAILY AFTERNOON


Hydrochlorothiazide (Hydrochlorothiazide), 12.5 MG PO QAM


Loratadine (Claritin), 10 MG PO QPM


Melatonin (Melatonin Maximum Strengt), 10 TAB PO HS


Metoprolol Succinate (Toprol Xl), 25 MG PO HS


Multivitamin (Multivitamin), 1 TAB PO DAILY AFTERNOON


Probiotic Product (Probiotic), PO QAM


Tamsulosin Hcl (Flomax), 0.4 MG PO QPM





Medication Instructions


For Your Scheduled Surgery 








- Hold the following medications 7 days prior to surgery per surgeon: 


Fish Oil ([place]-3), 1 CAP PO DAILY AFTERNOON








- Check with surgeon and cardiologist for instructions: 


Aspirin (Aspirin 81), 81 MG PO HS








- Hold the following medications the morning of surgery:


Multivitamin (Multivitamin), 1 TAB PO DAILY AFTERNOON


Probiotic Product (Probiotic), PO QAM


Hydrochlorothiazide (Hydrochlorothiazide), 12.5 MG PO QAM


Finasteride (Proscar), 5 MG PO QAM


Ascorbic Acid (Vitamin C), 1,000 MG PO DAILY AFTERNOON








- Take the following medications as scheduled the night before surgery:


Tamsulosin Hcl (Flomax), 0.4 MG PO QPM


Metoprolol Succinate (Toprol Xl), 25 MG PO HS


Loratadine (Claritin), 10 MG PO QPM


Melatonin (Melatonin Maximum Strengt), 10 TAB PO HS








If you have any questions please call us at [phone] or [phone] or 

[phone]

## 2018-04-06 ENCOUNTER — HOSPITAL ENCOUNTER (OUTPATIENT)
Dept: HOSPITAL 45 - C.CPL | Age: 78
Discharge: HOME | End: 2018-04-06
Attending: INTERNAL MEDICINE
Payer: COMMERCIAL

## 2018-04-06 DIAGNOSIS — I48.0: ICD-10-CM

## 2018-04-06 DIAGNOSIS — I50.30: Primary | ICD-10-CM

## 2018-04-06 DIAGNOSIS — H53.9: ICD-10-CM

## 2018-04-06 DIAGNOSIS — I11.0: ICD-10-CM

## 2018-04-06 NOTE — ECHOCARDIOGRAM REPORT
*NOTICE TO RECEIVING PARTY AGENCY**  This information is strictly Confidential and protected under 
[place] law.  [place] law prohibits you from making any further disclosure of this 
information unless further disclosure is expressly permitted by the written consent of the person to 
whom it pertains or is authorized by law.  A general authorization for the release of medical or 
other information is not sufficient for this purpose.  Hospital accepts no responsibility if the 
information is made available to any other person, INCLUDING THE PATIENT.



Interpretation Summary

  *  Name: RICHARD LEY  Study Date: 2018 12:40 PM

  *  MRN: [id number]  Patient Location: [facility]  HR: 79

  *  : 1940 (M/d/yyyy)  Gender: Male  Height: 69 in

  *  Age: 77 yrs  Ethnicity: [place]  Weight: 176 lb

  *  Ordering Physician: Angel Padron

  *  Referring Physician: Angel Padron

  *  Performed By: Amarilys Huizar RDCS

  *  Accession# [id number]  Account# [id number]

  *  Reason For Study: PAF, HTN, VISION CHANGES

  *  BSA: 2.0 m2

  *  -- Conclusions --

  *  No significant change compared to previous study of 17.

  *  Normal LV chamber size and wall thickness.

  *  Normal LV systolic function, EF 55-60%.

  *  No segmental left ventricular wall motion abnormalities are noted.

  *  Grade I diastolic dysfunction.

  *  No significant valvular pathology.

  *  The interatrial septum is intact with no evidence for an atrial septal defect.

  *  Injection of contrast documented no interatrial shunt.

Procedure Details

  *  A complete two-dimensional transthoracic echocardiogram was performed (2D, M-mode, Doppler and 
color flow Doppler).

  *  The study was technically difficult.

  *  There were technical limitations due to patient'spoor positioning. Patient was unable ot lay on 
left side due to injured left rotator cuff.

  *  A saline contrast injection was performed to assess for cardiac shunting.

  *  The injection was performed through an intravenous line in the right arm.

  *  The attending nurse who injected the saline contrast was abimael cunningham RN.

  *  A total of 20 cc of agitated saline was given.

  *  A contrast injection of Definity was performed to improve assessment of LV function.

  *  Contrast was injected into an intravenous site in the right arm.

  *  One vial of Definity ultrasound contrast was diluted in normal saline to a total volume of 10 
ml.  A total of '3' ml of solution was administered during imaging.

  *  Lot # [id number] of Definity utilized for procedure.

  *  Expiration date .

  *  The attending nurse who injected the contrast agent was abimael cunningham RN.

Left Ventricle

  *  The left ventricle is normal in size.

  *  There is normal left ventricular wall thickness.

  *  Ejection Fraction = 55-60%.

  *  Left ventricular systolic function is normal.

  *  No segmental left ventricular wall motion abnormalities are noted.

  *  The left ventricular wall motion is normal.

Right Ventricle

  *  The right ventricular cavity size is normal (basal dimension <4.2 cm in right ventricular 
apical 4-chamber view).

  *  The right ventricular systolic function is normal as assessed by tricuspid annular plane 
systolic excursion (TAPSE) (normal >1.5 cm).

Atria

  *  The left atrial size is normal.

  *  Right atrial size is normal.

  *  The interatrial septum is intact with no evidence for an atrial septal defect.

  *  Injection of contrast documented no interatrial shunt.

Mitral Valve

  *  The mitral valve is normal in structure and function.

Tricuspid Valve

  *  The tricuspid valve is normal in structure and function.

Aortic Valve

  *  The aortic valve is not well visualized.

  *  No hemodynamically significant valvular aortic stenosis.

  *  There is no significant aortic regurgitation.

Pulmonic Valve

  *  The pulmonary valve is not well seen, but the Doppler examination is normal without significant 
regurgitation or stenosis.

Great Vessels

  *  The aortic root is normal size.

Pericardium/Pleural

  *  There is no pericardial effusion.

Left Ventricular Diastolic Function

  *  Grade I diastolic dysfunction, (abnormal relaxation pattern).



MMode 2D Measurements and Calculations

IVSd 1.2 cm

IVSs 1.9 cm



LVIDd 4.8 cm

LVIDs 3.4 cm

LVPWd 0.89 cm

LVPWs 1.2 cm



IVS/LVPW 1.4 

FS 29.2 %

EDV(Teich) 105.0 ml

ESV(Teich) 46.2 ml

EF(Teich) 56.0 %



EDV(cubed) 107.3 ml

ESV(cubed) 38.0 ml

EF(cubed) 64.6 %

% IVS thick 53.0 %

% LVPW thick 36.0 %





LV mass(C)d 181.7 grams

LV mass(C)dI 92.9 grams/m\S\2

LV mass(C)s 193.3 grams

LV mass(C)sI 98.8 grams/m\S\2



SV(Teich) 58.8 ml

SI(Teich) 30.1 ml/m\S\2

SV(cubed) 69.3 ml

SI(cubed) 35.4 ml/m\S\2



LA dimension 3.4 cm



asc Aorta Diam 3.8 cm





LVOT diam 1.9 cm

LVOT area 2.9 cm\S\2



LVAd ap4 34.1 cm\S\2

LVLd ap4 7.5 cm

EDV(MOD-sp4) 124.5 ml

EDV(sp4-el) 131.6 ml

LVAs ap4 19.4 cm\S\2

LVLs ap4 5.9 cm

ESV(MOD-sp4) 53.5 ml

ESV(sp4-el) 54.4 ml

EF(MOD-sp4) 57.0 %

EF(sp4-el) 58.7 %



LVAd ap2 33.8 cm\S\2

LVLd ap2 8.6 cm

EDV(MOD-sp2) 108.7 ml

EDV(sp2-el) 112.6 ml

LVAs ap2 18.7 cm\S\2

LVLs ap2 6.5 cm

ESV(MOD-sp2) 44.2 ml

ESV(sp2-el) 46.0 ml

EF(MOD-sp2) 59.4 %

EF(sp2-el) 59.1 %



LVLd %diff 12.6 %

EDV(MOD-bp) 122.5 ml

LVLs %diff 8.9 %

ESV(MOD-bp) 50.0 ml

EF(MOD-bp) 59.1 %





SV(MOD-sp4) 71.0 ml

SI(MOD-sp4) 36.3 ml/m\S\2



SV(MOD-sp2) 64.6 ml

SI(MOD-sp2) 33.0 ml/m\S\2



SV(MOD-bp) 72.4 ml

SI(MOD-bp) 37.0 ml/m\S\2



SV(sp4-el) 77.2 ml

SI(sp4-el) 39.5 ml/m\S\2





SV(sp2-el) 66.6 ml

SI(sp2-el) 34.1 ml/m\S\2













Doppler Measurements and Calculations

MV E max kimmy 73.3 cm/sec

MV A max kimmy 75.5 cm/sec



MV E/A 0.97 



MV dec time 0.25 sec



Ao V2 max 133.7 cm/sec

Ao max PG 7.2 mmHg

Ao max PG (full) 1.0 mmHg

ANGELINA(V,A) 2.7 cm\S\2

ANGELINA(V,D) 2.7 cm\S\2





LV V1 max PG 6.2 mmHg



LV V1 max 124.0 cm/sec



PA V2 max 67.4 cm/sec

PA max PG 1.8 mmHg



TR max kimmy 267.5 cm/sec

## 2018-04-12 ENCOUNTER — HOSPITAL ENCOUNTER (OUTPATIENT)
Dept: HOSPITAL 45 - X.SURG | Age: 78
Discharge: HOME | End: 2018-04-12
Attending: ORTHOPAEDIC SURGERY
Payer: COMMERCIAL

## 2018-04-12 VITALS — TEMPERATURE: 97.7 F

## 2018-04-12 VITALS
WEIGHT: 173.37 LBS | HEIGHT: 69.02 IN | HEIGHT: 69.02 IN | BODY MASS INDEX: 25.68 KG/M2 | BODY MASS INDEX: 25.68 KG/M2 | WEIGHT: 173.37 LBS

## 2018-04-12 VITALS — DIASTOLIC BLOOD PRESSURE: 69 MMHG | SYSTOLIC BLOOD PRESSURE: 109 MMHG | OXYGEN SATURATION: 95 % | HEART RATE: 87 BPM

## 2018-04-12 DIAGNOSIS — I48.0: ICD-10-CM
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
## 2018-04-12 DIAGNOSIS — Z85.820: ICD-10-CM

## 2018-04-12 DIAGNOSIS — I10: ICD-10-CM
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
## 2018-04-12 DIAGNOSIS — N40.0: ICD-10-CM

## 2018-04-12 DIAGNOSIS — S46.012A: Primary | ICD-10-CM

## 2018-04-12 DIAGNOSIS — Z87.891: ICD-10-CM
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
## 2018-04-12 NOTE — DISCHARGE INSTRUCTIONS-SURGCTR
Discharge Instructions


Date of Service


Apr 12, 2018.





Visit


Reason for Visit:  Full Thickness Rotator Cuff Tear, Shoulder Pain





Discharge


Discharge Diagnosis / Problem:  SAME AS ABOVE





Discharge Goals


Goal(s):  Decrease discomfort, Improve function





Medications


Stopped Medications Name(s):  


held aspirin and fish oil for seven days


Restart Stopped Medication(s):


MAY RESTART WHEN FINISHED WITH THE TORADOL


TAKE TORADOL EVERY 8 HOURS WITH FOOD





Activity Recommendations


Activity Limitations:  as noted below


Lifting Limitations:  until after follow-up appointment


Exercise/Sports Limitations:  until after follow-up appointment


Shower/Bathe:  tomorrow





Anesthesia


.





Post Anesthesia Instructions:





If you have had General Anesthesia or IV Sedation:





*  Do not drive today.


*  Resume driving when surgeon permits.


*  Do not make important decisions or sign legal documents today.


*  Call surgeon for:





   1.  Temperature elevations greater than 101 degrees F.


   2.  Uncontrollable pain.


   3.  Excessive bleeding.


   4.  Persistent nausea and vomiting.


   5.  Medication intolerance (nausea, vomiting or rash).





*  For nausea and vomiting use only clear liquids such as: tea, soda, bouillon 

until nausea subsides, then gradually increase diet as tolerated.





*  If you have any concerns or questions, call your surgeon's office.  If 

physician is unavailable and it is an emergency, call 911 or go to the nearest 

emergency room.





.





Instructions / Follow-Up


Instructions / Follow-Up





MEDICATIONS:





*  Resume previous medications unless instructed otherwise by your surgeon.





*  Always take pain medication on a full stomach or with food to avoid upset 

stomach. 





*  Do not drink alcohol or drive while taking narcotics.





*  Ibuprofen or Tylenol may be taken if narcotic not needed.








SPECIAL CARE INSTRUCTIONS:





__ None





_X_ Keep extremity elevated and iced x 48 hours; apply ice 20-30 


    minutes 8-10 times/day. May remove at night.





__ Sling


    __24 hrs/day


    __ Remove at night





_X_ Shoulder Immobilizer (MAY REMOVE AFTER 48 HOURS ONLY TO SHOWER AND FOR 

THERAPY) 


    _X_ 24 hrs/day


    __ Remove at night





_X_ Dressing


    __ Maintain until seen in office, may shower with plastic over site


    _X_ Remove dressings in 24-48 hours and then may shower


    _X_ Cover incisions with band-aids after showering


    __ Do not remove steri-strips





Call physician if chills or temperature rises above 102 degrees or pain


unrelieved by prescribed pain medications at [phone].


.





Diet Recommendations


Home Diet:  no limitations


Fluid Restriction:  None





Procedures


Procedures Performed:  


Left Shoulder Arthroscopy, Large Rotator Cuff Tear, Acromioplasty, Biceps 


Tenodesis





Pending Studies


Studies pending at discharge:  no





Medical Emergencies








.


Who to Call and When:





Medical Emergencies:  If at any time you feel your situation is an emergency, 

please call 911 immediately.





.





Non-Emergent Contact


Non-Emergency issues call your:  Primary Care Provider


Call Non-Emergent contact if:  you have a fever, temperature is above 101.5





.


.








"Provider Documentation" section prepared by Ezekiel Jacobs.








.

## 2018-04-12 NOTE — OPERATIVE REPORT
DATE OF OPERATION:  04/12/2018

 

PREOPERATIVE DIAGNOSIS:  Large left rotator cuff tear.

 

POSTOPERATIVE DIAGNOSIS:  Same.

 

PROCEDURE:  Left shoulder diagnostic arthroscopy with extensive debridement,

distal clavicle resection to include co-planing the undersurface of the

clavicle, acromioplasty, large rotator cuff repair, and arthroscopic biceps

tenodesis.

 

SURGEON:  Dr. Barber Rubio.

 

ASSISTANT:  Ezekiel Jacobs PA-C, whose assistance was necessary for

positioning the arm and help with arthroscopic instrumentation and closure.

 

ANESTHESIA:  General with a left interscalene nerve block.

 

COMPLICATIONS:  None.

 

CONDITION:  Stable to PACU.

 

INDICATIONS:  Km is a pleasant 77-year-old male who fell 2 months ago

while skiing at [employer].  He sustained a left shoulder dislocation. 

He came to my office and was having complaints of pain and weakness.  MRI was

diagnostic for a large rotator cuff tear with medially subluxated biceps

tendon.  After failing conservative treatment, he elected to undergo

arthroscopy.

 

DESCRIPTION OF PROCEDURE:  On 04/12/2018, he arrived at [facility] for the above procedure.  He was seen in the preoperative

holding and the operative extremity was identified and signed.  He was given

a preoperative antibiotic and a left interscalene nerve block.  He was taken

back to the operating room, laid on the table in supine position and put

under general anesthesia.  He was put into the beach chair position.  Left

shoulder was prepped and draped in sterile fashion.  Time-out was done.  The

patient's operative extremity was properly identified.

 

A scope was introduced in the posterior portal.  Diagnostic arthroscopy

showed mild grade 1 or 2 chondral damage on the glenoid and the humeral head,

but not much.  There was up tearing and fraying of the anterior and anterior

inferior labrum.  The biceps tendon was slightly subluxated medially and

significantly frayed.  There was no tearing of the subscapularis.  There was

a tear of the entire supraspinatus and infraspinatus and teres minor was

intact.  An anterior portal was made.  A shaver was used to start debridement

of the intraarticular structures.  The anterior labrum was debrided back to

stable margins.  The long head of the biceps tendon was pulled into the joint

and tenotomized for later tenodesis.  The scope was then placed in the

subacromial space.  A lateral portal was made.  A shaver was used to do a

complete subacromial and subdeltoid bursectomy.  An ablator was used to tease

the coracoacromial ligament off the undersurface of the acromion and a 5-0

javier was used to clean acromioplasty of a Bigliani type 3 acromion.  A shaver

was used to remove any excess debris.  Attention was turned to the distal

clavicle.  There were large osteophytes hanging on the undersurface of the

distal clavicle.  This was encroaching upon the supraspinatus outlet.  A 5-0

javier was used to co-plane the undersurface of the clavicle.  This opened up

the supraspinatus outlet.  Attention was then turned to the rotator cuff.  An

additional anterolateral portal was made and Nissa cannulas were placed in

each of the lateral portals.  The greater tuberosity was prepared with a ring

curette and micro fractured.  It was a very large crescent-shaped rotator

cuff tear.  The rotator cuff was then fixed with an Arthrex suspension bridge

configuration using BioComposite SwiveLock suture anchors, 3 on the medial

row and 3 on the lateral row with FiberTapes.  This gave a nice knotless

repair.  Multiple pictures were taken.  The long head of the biceps tendon

was tagged with an Arthrex FiberLink and incorporated into the anterior

medial anchor to complete an arthroscopic biceps tenodesis.  The shoulder was

brought through a full range of motion and the rotator cuff seemed stable. 

The scope was placed back into the glenohumeral joint.  A shaver was used to

debride back the biceps stump and remove any debris.  The articular margin of

the rotator cuff had been restored.  Pictures were taken.  Arthroscopic

instruments were removed from the shoulder.  Portal sites were closed with

3-0 nylon.  He was then placed in a soft dressing and an abduction arm sling.

 He was then extubated, transferred to a [facility] and taken to the

postanesthesia care unit in stable condition.  He tolerated the procedure

well.

 

 

I attest to the content of the Intraoperative Record and any orders documented therein. Any exception
s are noted below.

## 2018-04-12 NOTE — ANESTHESIA PROGRESS NT - [facility]
Anesthesia Post Op Note


Date & Time


Apr 12, 2018 at 16:27





Vital Signs


Pain Intensity:  0





Vital Signs Past 12 Hours








  Date Time  Temp Pulse Resp B/P (MAP) Pulse Ox O2 Delivery O2 Flow Rate FiO2


 


4/12/18 16:25  87 16 109/69 (82) 95 Room Air  


 


4/12/18 15:57 36.5 87 16 127/77 (94) 96 Room Air  


 


4/12/18 15:52  86 25  98   


 


4/12/18 15:52  84 25     


 


4/12/18 15:50    130/79    


 


4/12/18 15:49 36.3 6 12 130/79 97 Room Air  


 


4/12/18 15:47  91 21     


 


4/12/18 15:47  92 21  95   


 


4/12/18 15:46    129/78    


 


4/12/18 15:42  90 23     


 


4/12/18 15:42  91 23  99   


 


4/12/18 15:41    124/74    


 


4/12/18 15:37  88 22  100   


 


4/12/18 15:37  89 22     


 


4/12/18 15:35    139/75    


 


4/12/18 15:32  91 17     


 


4/12/18 15:32  90 17  100   


 


4/12/18 15:30    142/75    


 


4/12/18 15:27  93 17     


 


4/12/18 15:27  96 17  100   


 


4/12/18 15:25    127/72    


 


4/12/18 15:22  90 31     


 


4/12/18 15:22  90 31  100   


 


4/12/18 15:20    136/80    


 


4/12/18 15:18    131/73    


 


4/12/18 15:18 36.1 103 14 131/73 100 Mask 6 


 


4/12/18 13:22  84      


 


4/12/18 13:22  88 16  96   


 


4/12/18 13:21  83      


 


4/12/18 13:21  86 20  100   


 


4/12/18 13:20    120/67    


 


4/12/18 13:16  83      


 


4/12/18 13:16  89 13  99   


 


4/12/18 13:11  94      


 


4/12/18 13:11  97 22  97   


 


4/12/18 13:06  91 0 143/81 99   


 


4/12/18 13:06  87      


 


4/12/18 13:02    124/64    


 


4/12/18 13:01  82      


 


4/12/18 13:01  85   94   


 


4/12/18 12:56  85   94   


 


4/12/18 12:56  79      


 


4/12/18 12:51  76      


 


4/12/18 12:51  90   95   


 


4/12/18 12:46  74      


 


4/12/18 12:46  83  128/65 96   


 


4/12/18 12:41  82  128/76 96   


 


4/12/18 12:41  71      


 


4/12/18 12:36  84  132/72 95   


 


4/12/18 12:36  80      


 


4/12/18 12:31  85   96   


 


4/12/18 12:31  64      


 


4/12/18 12:30    122/76    


 


4/12/18 12:26  87   96   


 


4/12/18 12:26  73      


 


4/12/18 12:25    131/81    


 


4/12/18 12:21  87   97   


 


4/12/18 12:21  76      


 


4/12/18 12:20  83   98   


 


4/12/18 12:20  75      


 


4/12/18 12:15  92   98   


 


4/12/18 12:15  97      


 


4/12/18 12:10  87   95   


 


4/12/18 12:10  80      


 


4/12/18 12:05  91   97   


 


4/12/18 12:05  77      


 


4/12/18 12:00  88   96   


 


4/12/18 12:00  77      


 


4/12/18 11:55  95      


 


4/12/18 11:50  91      


 


4/12/18 11:45  91   98   


 


4/12/18 11:45  88      


 


4/12/18 11:40  73      


 


4/12/18 11:40  94   96   


 


4/12/18 11:35  95      


 


4/12/18 11:27 36.4 84 18 121/71 (88) 97 Room Air  


 


4/12/18 11:25    131/84    











Notes


Mental Status:  alert / awake / arousable, participated in evaluation


Pt Amnestic to Procedure:  Yes


Nausea / Vomiting:  adequately controlled


Pain:  adequately controlled


Airway Patency, RR, SpO2:  stable & adequate


BP & HR:  stable & adequate


Hydration State:  stable & adequate


Anesthetic Complications:  no major complications apparent

## 2018-04-12 NOTE — [facility] POST OPERATIVE BRIEF NOTE
Immediate Operative Summary


Operative Date


Apr 12, 2018.





Pre-Operative Diagnosis





Left Shoulder Full Thickness Rotator Cuff Tear, Shoulder Pain





Post-Operative Diagnosis





Same





Procedure(s) Performed





Left Shoulder Arthroscopy, Large Rotator Cuff Tear, Acromioplasty, Biceps 


Tenodesis





Surgeon


Dr. Rubio





Assistant Surgeon(s)


PATRICK Jacobs PA-C





Estimated Blood Loss


5ml





Findings


Consistent with Post-Op Diagnosis





Specimens





None





Drains


None





Anesthesia Type


General Regional





Complication(s)


none





Disposition


Disposition:  Recovery Room / PACU

## 2023-07-25 NOTE — DIAGNOSTIC IMAGING REPORT
ABDOMINAL ULTRASOUND, RIGHT UPPER QUADRANT



HISTORY:      epigastric pain, abnormal GB on CT.



COMPARISON:  Abdomen and pelvis CT 4/17/2017.



FINDINGS:



Pancreas: Obscured by overlying bowel gas.



Liver: Unremarkable.



Gallbladder: Multiple stones within the gallbladder with gallbladder wall

thickening measuring 4 mm. Possible small focus of adenomyomatosis.



CBD: 4 mm.



Right kidney: No hydronephrosis. A 1.2 cm parapelvic cyst.



IMPRESSION: 



1. Multiple gallstones with mild gallbladder wall thickening. This is concerning

for acute cholecystitis. Clinical correlation recommended.

2. Normal caliber common bile duct.

3. The pancreas was obscured by overlying bowel gas.







Electronically signed by:  Howie Means M.D.

4/17/2017 7:15 AM



Dictated Date/Time:  4/17/2017 7:13 AM Alert-The patient is alert, awake and responds to voice. The patient is oriented to time, place, and person. The triage nurse is able to obtain subjective information.